# Patient Record
Sex: MALE | Race: WHITE | NOT HISPANIC OR LATINO | Employment: OTHER | URBAN - METROPOLITAN AREA
[De-identification: names, ages, dates, MRNs, and addresses within clinical notes are randomized per-mention and may not be internally consistent; named-entity substitution may affect disease eponyms.]

---

## 2017-08-12 ENCOUNTER — ANESTHESIA EVENT (OUTPATIENT)
Dept: SURGERY | Facility: CLINIC | Age: 27
End: 2017-08-12
Payer: MEDICAID

## 2017-08-13 ENCOUNTER — NURSE TRIAGE (OUTPATIENT)
Dept: NURSING | Facility: CLINIC | Age: 27
End: 2017-08-13

## 2017-08-13 NOTE — TELEPHONE ENCOUNTER
Mother calling to report that Ge is scheduled for a teeth cleaning under anesthesia tomorrow morning. But this morning had a several hour episode of very mild nasal  Congestion. Has since resolved. Denies fever, cough, breathing problems or other concerns. Advised as long as continues to do well and be symptom free  Should be ok for procedure tomorrow. If symptoms redevelop contact surgery clinic in AM.  Jael Whitfield RN   [facility]    Additional Information    Negative: Runny nose is caused by pollen or other allergies    Negative: Cough is main symptom    Negative: Severe sore throat    Negative: Fever > 104 F (40 C)    Negative: [1] Difficulty breathing AND [2] not severe AND [3] not from stuffy nose (e.g., not relieved by cleaning out the nose)    Negative: Patient sounds very sick or weak to the triager    Negative: [1] Fever > 101 F (38.3 C) AND [2] age > 60    Negative: [1] Fever > 101 F (38.3 C) AND [2] bedridden (e.g., nursing home patient, CVA, chronic illness, recovering from surgery)    Negative: [1] Fever > 100.5 F (38.1 C) AND [2] diabetes mellitus or weak immune system (e.g., HIV positive, cancer chemo, splenectomy, organ transplant, chronic steroids)    Negative: Fever present > 3 days (72 hours)    Negative: [1] Fever returns after gone for over 24 hours AND [2] symptoms worse or not improved    Negative: [1] Sinus pain (not just congestion) AND [2] fever    Negative: Earache    Negative: [1] SEVERE sore throat AND [2] present > 24 hours    Negative: [1] Sinus congestion (pressure, fullness) AND [2] present > 10 days    Negative: [1] Nasal discharge AND [2] present > 10 days    Negative: [1] Using nasal washes and pain medicine > 24 hours AND [2] sinus pain (lower forehead, cheekbone, or eye) persists    Negative: Sores with yellow scabs around the nasal opening    Negative: Cold with no complications (all triage questions negative)    Negative: Care advice for mild cough,  questions about    Negative: Care advice for fever, questions about    Negative: Zinc, vitamin C, and echinacea to treat the common cold, questions about    Negative: Neti Pot, questions about    Negative: [1] Caller is not with the adult (patient) AND [2] reporting urgent symptoms    Negative: Lab result questions    Negative: Medication questions    Negative: Caller cannot be reached by phone    Negative: Caller has already spoken to PCP or another triager    Negative: RN needs further essential information from caller in order to complete triage    Negative: Requesting regular office appointment    Negative: [1] Caller requesting NON-URGENT health information AND [2] PCP's office is the best resource    Health Information question, no triage required and triager able to answer question    Protocols used: COMMON COLD-ADULT-AH, INFORMATION ONLY CALL-ADULT-AH

## 2017-08-14 ENCOUNTER — ANESTHESIA (OUTPATIENT)
Dept: SURGERY | Facility: CLINIC | Age: 27
End: 2017-08-14
Payer: MEDICAID

## 2017-08-14 ENCOUNTER — SURGERY (OUTPATIENT)
Age: 27
End: 2017-08-14

## 2017-08-14 ENCOUNTER — HOSPITAL ENCOUNTER (OUTPATIENT)
Facility: CLINIC | Age: 27
Discharge: HOME | End: 2017-08-14
Attending: DENTIST | Admitting: DENTIST
Payer: MEDICAID

## 2017-08-14 VITALS
DIASTOLIC BLOOD PRESSURE: 88 MMHG | SYSTOLIC BLOOD PRESSURE: 124 MMHG | WEIGHT: 253.09 LBS | OXYGEN SATURATION: 92 % | HEIGHT: 73 IN | TEMPERATURE: 98.3 F | BODY MASS INDEX: 33.54 KG/M2 | HEART RATE: 108 BPM | RESPIRATION RATE: 15 BRPM

## 2017-08-14 PROCEDURE — 37000008 ZZH ANESTHESIA TECHNICAL FEE, 1ST 30 MIN: Performed by: DENTIST

## 2017-08-14 PROCEDURE — 25000128 H RX IP 250 OP 636: Performed by: DENTIST

## 2017-08-14 PROCEDURE — 36000051 ZZH SURGERY LEVEL 2 1ST 30 MIN - UMMC: Performed by: DENTIST

## 2017-08-14 PROCEDURE — 71000027 ZZH RECOVERY PHASE 2 EACH 15 MINS: Performed by: DENTIST

## 2017-08-14 PROCEDURE — 25000132 ZZH RX MED GY IP 250 OP 250 PS 637: Performed by: ANESTHESIOLOGY

## 2017-08-14 PROCEDURE — 36000053 ZZH SURGERY LEVEL 2 EA 15 ADDTL MIN - UMMC: Performed by: DENTIST

## 2017-08-14 PROCEDURE — 25000128 H RX IP 250 OP 636: Performed by: NURSE ANESTHETIST, CERTIFIED REGISTERED

## 2017-08-14 PROCEDURE — 27210794 ZZH OR GENERAL SUPPLY STERILE: Performed by: DENTIST

## 2017-08-14 PROCEDURE — 25000125 ZZHC RX 250: Performed by: NURSE ANESTHETIST, CERTIFIED REGISTERED

## 2017-08-14 PROCEDURE — 37000009 ZZH ANESTHESIA TECHNICAL FEE, EACH ADDTL 15 MIN: Performed by: DENTIST

## 2017-08-14 PROCEDURE — 40000170 ZZH STATISTIC PRE-PROCEDURE ASSESSMENT II: Performed by: DENTIST

## 2017-08-14 PROCEDURE — 71000014 ZZH RECOVERY PHASE 1 LEVEL 2 FIRST HR: Performed by: DENTIST

## 2017-08-14 PROCEDURE — 25000566 ZZH SEVOFLURANE, EA 15 MIN: Performed by: DENTIST

## 2017-08-14 PROCEDURE — 25000128 H RX IP 250 OP 636: Performed by: ANESTHESIOLOGY

## 2017-08-14 RX ORDER — KETAMINE HYDROCHLORIDE 10 MG/ML
INJECTION, SOLUTION INTRAMUSCULAR; INTRAVENOUS PRN
Status: DISCONTINUED | OUTPATIENT
Start: 2017-08-14 | End: 2017-08-14

## 2017-08-14 RX ORDER — SODIUM CHLORIDE, SODIUM LACTATE, POTASSIUM CHLORIDE, CALCIUM CHLORIDE 600; 310; 30; 20 MG/100ML; MG/100ML; MG/100ML; MG/100ML
INJECTION, SOLUTION INTRAVENOUS CONTINUOUS
Status: DISCONTINUED | OUTPATIENT
Start: 2017-08-14 | End: 2017-08-14 | Stop reason: HOSPADM

## 2017-08-14 RX ORDER — FENTANYL CITRATE 50 UG/ML
INJECTION, SOLUTION INTRAMUSCULAR; INTRAVENOUS PRN
Status: DISCONTINUED | OUTPATIENT
Start: 2017-08-14 | End: 2017-08-14

## 2017-08-14 RX ORDER — LIDOCAINE HYDROCHLORIDE 20 MG/ML
INJECTION, SOLUTION INFILTRATION; PERINEURAL PRN
Status: DISCONTINUED | OUTPATIENT
Start: 2017-08-14 | End: 2017-08-14

## 2017-08-14 RX ORDER — ONDANSETRON 2 MG/ML
INJECTION INTRAMUSCULAR; INTRAVENOUS PRN
Status: DISCONTINUED | OUTPATIENT
Start: 2017-08-14 | End: 2017-08-14

## 2017-08-14 RX ORDER — PROPOFOL 10 MG/ML
INJECTION, EMULSION INTRAVENOUS PRN
Status: DISCONTINUED | OUTPATIENT
Start: 2017-08-14 | End: 2017-08-14

## 2017-08-14 RX ORDER — CEFAZOLIN SODIUM 2 G/100ML
2 INJECTION, SOLUTION INTRAVENOUS
Status: COMPLETED | OUTPATIENT
Start: 2017-08-14 | End: 2017-08-14

## 2017-08-14 RX ORDER — OXYMETAZOLINE HYDROCHLORIDE 0.05 G/100ML
SPRAY NASAL PRN
Status: DISCONTINUED | OUTPATIENT
Start: 2017-08-14 | End: 2017-08-14

## 2017-08-14 RX ORDER — MEPERIDINE HYDROCHLORIDE 25 MG/ML
12.5 INJECTION INTRAMUSCULAR; INTRAVENOUS; SUBCUTANEOUS
Status: DISCONTINUED | OUTPATIENT
Start: 2017-08-14 | End: 2017-08-14 | Stop reason: HOSPADM

## 2017-08-14 RX ORDER — LIDOCAINE 40 MG/G
CREAM TOPICAL
Status: DISCONTINUED | OUTPATIENT
Start: 2017-08-14 | End: 2017-08-14 | Stop reason: HOSPADM

## 2017-08-14 RX ORDER — ONDANSETRON 4 MG/1
4 TABLET, ORALLY DISINTEGRATING ORAL EVERY 30 MIN PRN
Status: DISCONTINUED | OUTPATIENT
Start: 2017-08-14 | End: 2017-08-14 | Stop reason: HOSPADM

## 2017-08-14 RX ORDER — HYDROMORPHONE HYDROCHLORIDE 1 MG/ML
.3-.5 INJECTION, SOLUTION INTRAMUSCULAR; INTRAVENOUS; SUBCUTANEOUS EVERY 10 MIN PRN
Status: DISCONTINUED | OUTPATIENT
Start: 2017-08-14 | End: 2017-08-14 | Stop reason: HOSPADM

## 2017-08-14 RX ORDER — NEOSTIGMINE METHYLSULFATE 1 MG/ML
VIAL (ML) INJECTION PRN
Status: DISCONTINUED | OUTPATIENT
Start: 2017-08-14 | End: 2017-08-14

## 2017-08-14 RX ORDER — MIDAZOLAM HYDROCHLORIDE 2 MG/ML
20 SYRUP ORAL ONCE
Status: COMPLETED | OUTPATIENT
Start: 2017-08-14 | End: 2017-08-14

## 2017-08-14 RX ORDER — FENTANYL CITRATE 50 UG/ML
25-50 INJECTION, SOLUTION INTRAMUSCULAR; INTRAVENOUS EVERY 5 MIN PRN
Status: DISCONTINUED | OUTPATIENT
Start: 2017-08-14 | End: 2017-08-14 | Stop reason: HOSPADM

## 2017-08-14 RX ORDER — NALOXONE HYDROCHLORIDE 0.4 MG/ML
.1-.4 INJECTION, SOLUTION INTRAMUSCULAR; INTRAVENOUS; SUBCUTANEOUS
Status: DISCONTINUED | OUTPATIENT
Start: 2017-08-14 | End: 2017-08-14 | Stop reason: HOSPADM

## 2017-08-14 RX ORDER — GLYCOPYRROLATE 0.2 MG/ML
INJECTION, SOLUTION INTRAMUSCULAR; INTRAVENOUS PRN
Status: DISCONTINUED | OUTPATIENT
Start: 2017-08-14 | End: 2017-08-14

## 2017-08-14 RX ORDER — DEXAMETHASONE SODIUM PHOSPHATE 4 MG/ML
INJECTION, SOLUTION INTRA-ARTICULAR; INTRALESIONAL; INTRAMUSCULAR; INTRAVENOUS; SOFT TISSUE PRN
Status: DISCONTINUED | OUTPATIENT
Start: 2017-08-14 | End: 2017-08-14

## 2017-08-14 RX ORDER — ONDANSETRON 2 MG/ML
4 INJECTION INTRAMUSCULAR; INTRAVENOUS EVERY 30 MIN PRN
Status: DISCONTINUED | OUTPATIENT
Start: 2017-08-14 | End: 2017-08-14 | Stop reason: HOSPADM

## 2017-08-14 RX ADMIN — Medication 50 MG: at 08:01

## 2017-08-14 RX ADMIN — ONDANSETRON 4 MG: 2 INJECTION INTRAMUSCULAR; INTRAVENOUS at 10:20

## 2017-08-14 RX ADMIN — CEFAZOLIN SODIUM 2 G: 2 INJECTION, SOLUTION INTRAVENOUS at 08:17

## 2017-08-14 RX ADMIN — OXYMETAZOLINE HYDROCHLORIDE 2 SPRAY: 5 SPRAY NASAL at 08:01

## 2017-08-14 RX ADMIN — FENTANYL CITRATE 25 MCG: 50 INJECTION, SOLUTION INTRAMUSCULAR; INTRAVENOUS at 09:46

## 2017-08-14 RX ADMIN — Medication 1 MG: at 10:20

## 2017-08-14 RX ADMIN — DEXAMETHASONE SODIUM PHOSPHATE 8 MG: 4 INJECTION, SOLUTION INTRAMUSCULAR; INTRAVENOUS at 08:45

## 2017-08-14 RX ADMIN — DEXMEDETOMIDINE HYDROCHLORIDE 4 MCG: 100 INJECTION, SOLUTION INTRAVENOUS at 07:51

## 2017-08-14 RX ADMIN — LIDOCAINE HYDROCHLORIDE 100 MG: 20 INJECTION, SOLUTION INFILTRATION; PERINEURAL at 07:59

## 2017-08-14 RX ADMIN — NEOSTIGMINE METHYLSULFATE 5 MG: 1 INJECTION INTRAMUSCULAR; INTRAVENOUS; SUBCUTANEOUS at 10:20

## 2017-08-14 RX ADMIN — MIDAZOLAM HYDROCHLORIDE 2 MG: 1 INJECTION, SOLUTION INTRAMUSCULAR; INTRAVENOUS at 07:51

## 2017-08-14 RX ADMIN — KETAMINE HCL-NACL SOLN PREF SY 50 MG/5ML-0.9% (10MG/ML) 50 MG: 10 SOLUTION PREFILLED SYRINGE at 07:59

## 2017-08-14 RX ADMIN — FENTANYL CITRATE 75 MCG: 50 INJECTION, SOLUTION INTRAMUSCULAR; INTRAVENOUS at 07:59

## 2017-08-14 RX ADMIN — MIDAZOLAM HYDROCHLORIDE 20 MG: 2 SYRUP ORAL at 07:12

## 2017-08-14 RX ADMIN — PROPOFOL 150 MG: 10 INJECTION, EMULSION INTRAVENOUS at 08:01

## 2017-08-14 RX ADMIN — SODIUM CHLORIDE, POTASSIUM CHLORIDE, SODIUM LACTATE AND CALCIUM CHLORIDE: 600; 310; 30; 20 INJECTION, SOLUTION INTRAVENOUS at 07:51

## 2017-08-14 RX ADMIN — DEXMEDETOMIDINE HYDROCHLORIDE 4 MCG: 100 INJECTION, SOLUTION INTRAVENOUS at 07:53

## 2017-08-14 NOTE — ANESTHESIA PREPROCEDURE EVALUATION
Anesthesia Evaluation     . Pt has had prior anesthetic. Type: General    History of anesthetic complications   - PONV        ROS/MED HX    ENT/Pulmonary:  - neg pulmonary ROS     Neurologic:     (+)Developmental delay (fetal alcohol syndrome)      Cardiovascular:  - neg cardiovascular ROS       METS/Exercise Tolerance:  3 - Able to walk 1-2 blocks without stopping   Hematologic:         Musculoskeletal:         GI/Hepatic:  - neg GI/hepatic ROS       Renal/Genitourinary:  - ROS Renal section negative       Endo:  - neg endo ROS       Psychiatric:         Infectious Disease:         Malignancy:         Other:                     Physical Exam      Airway   Comment: Unable to cooperate     Dental     Cardiovascular   Rhythm and rate: regular and normal      Pulmonary    breath sounds clear to auscultation                    Anesthesia Plan      History & Physical Review  History and physical reviewed and following examination; no interval change.    ASA Status:  2 .    NPO Status:  > 8 hours    Plan for General and ETT with Intravenous induction. Maintenance will be Inhalation.    PONV prophylaxis:  Ondansetron (or other 5HT-3), Dexamethasone or Solumedrol and Droperidol or Haldol       Postoperative Care  Postoperative pain management:  IV analgesics.      Consents  Anesthetic plan, risks, benefits and alternatives discussed with:  Parent (Mother and/or Father)..                          .

## 2017-08-14 NOTE — IP AVS SNAPSHOT
[place] [id number] [facility] [address]    Phone:  [phone]                                       After Visit Summary   8/14/2017    eG Gregory    MRN: [id number]           After Visit Summary Signature Page     I have received my discharge instructions, and my questions have been answered. I have discussed any challenges I see with this plan with the nurse or doctor.    ..........................................................................................................................................  Patient/Patient Representative Signature      ..........................................................................................................................................  Patient Representative Print Name and Relationship to Patient    ..................................................               ................................................  Date                                            Time    ..........................................................................................................................................  Reviewed by Signature/Title    ...................................................              ..............................................  Date                                                            Time

## 2017-08-14 NOTE — BRIEF OP NOTE
[facility], [place]    Brief Operative Note    Pre-operative diagnosis: Dental Caries, Periodontal Disease   Post-operative diagnosis Dental caries chronic gingivitis  Procedure: Procedure(s):  Dental Exam, Restorations x4, Radiographs, Periodontal Therapy, fluride treatment - Wound Class: II-Clean Contaminated  Surgeon: Surgeon(s) and Role:     * Wilfred Campos DDS - Primary     * Fabian Abad MD - Resident - Assisting  Anesthesia: General   Estimated blood loss: 2 cc  Drains: None  Specimens: * No specimens in log *  Findings:   None.  Complications: None.  Implants: None.

## 2017-08-14 NOTE — IP AVS SNAPSHOT
MRN:[id number]                      After Visit Summary   8/14/2017    Ge Gregory    MRN: [id number]           Thank you!     Thank you for choosing [facility] for your care. Our goal is always to provide you with excellent care. Hearing back from our patients is one way we can continue to improve our services. Please take a few minutes to complete the written survey that you may receive in the mail after you visit with us. Thank you!        Patient Information     Date Of Birth          1990        About your hospital stay     You were admitted on:  August 14, 2017 You last received care in the:  [facility] PACU    You were discharged on:  August 14, 2017       Who to Call     For medical emergencies, please call 911.  For non-urgent questions about your medical care, please call your primary care provider or clinic, [phone]  For questions related to your surgery, please call your surgery clinic        Attending Provider     Provider Wilfred Nova DDS Dentist       Primary Care Provider Office Phone # Fax #    Missy Thompson [phone]      After Care Instructions     Discharge Instructions       Return to [facility] dental clinic in 6 months for recall and follow up.  Call the dental clinic to schedule the appointment.  Clinic phone: [phone]  Emergency post op care (after business hours and weekends) call: [phone], and ask for the dental resident on call.    Procedures Performed Today (August 14, 2017)  Dental exam, dental x-rays, cleaning, dental fillings, fluoride varnish.     Post-operative oral surgery instructions  Care of the mouth following a surgical procedure is essential in the healing process.  There is a certain amount of swelling, discoloration, discomfort and bleeding which can be expected.     Swelling:  Some degree of swelling is normal and can be minimized with the use of ice or cold packs applied to the area for 15-20 minutes and then  removed for 15-20 minutes.  This should only be done for the first 24 hours, after 24 hours use a warm moist compress over the area for 15-20 minutes and then remove for 15-20 minutes.  Sit upright and keep your head elevated when sleeping.  Maximum swelling will occur about the second or third post-operative day and then slowly recede. Once present, it can remain swollen for up to 7 days and discomfort may persist for 10 days.    Discomfort:   A variable amount of pain follows extraction and oral surgery procedures. Tylenol, ibuprofen, or any over the counter pain medication can be used. In some cases, prescription pain medication will be given which  should be taken exactly as directed, and taken before the local anesthetic wears off. If pain increases for more than 3 days call the clinic.   Do not take pain medication on an empty stomach as it may cause nausea.     Bleeding:  Some bleeding and oozing is to be expected for several hours.  Avoid spitting, rinsing, swishing, and use of a straw for the next 24-48 hours, as they may provoke oozing.  If bleeding is visible then place a moistened gauze pack over the area and keep firm pressure on the gauze pack for 30 minutes and then discard.     Discoloration:   Facial discoloration (black and blue bruising) often follows oral surgery procedures. Discoloration is normal and is no cause for alarm. It may persist for as long as several weeks.    Jaw Stiffness:   For several days following most oral surgical procedures, the jaw may become somewhat stiff. Should jaw stiffness worsen after 2  weeks, call the clinic.    Numbness:    Local anesthetics may be effective for approximately 24-48 hours. If you are numb beyond this time frame, please call the clinic.    Nausea:    Nausea is common after surgery, and it is sometimes caused by pain medicines. Nausea may be reduced by preceding each pill with a small amount of soft food, then taking the pill with a large volume of  water. Continue consuming clear fluids and minimize the pain medication. Call if you don't feel better or if vomiting is a problem. Soft drinks that have less carbonation may help with nausea.    Care of the mouth:  A day following surgery rinse with warm salt water after each meal or 3-4 times a day (One half teaspoonful of table salt in a full glass of warm water). Resume normal oral hygiene (brushing and flossing) within 24 hours after procedure.  Clean your teeth within the bounds of comfort.   Avoid use of alcohol, smoking, or carbonated drinks for 24-48 hours after surgery.  This may slow the healing process.      Diet:  A soft or liquid diet is recommended for the first few days following surgery, advance as tolerated. Until local anesthesia (numbness) wears off, be careful chewing to prevent biting the numb area. Eat soft and liquid foods such as yogurt, cottage cheese, soup etc. Try not to skip a meal, and keep up normal diet.    Rest:  Rest as much as possible for the next 24 hours, avoiding any excessive amount of physical activity.     Fluoride Varnish:  A 5% sodium fluoride varnish was placed over the teeth for the prevention of dental decay.  The varnish hardens on contact with saliva so the teeth may appear spotty or as if there is a thin film coating the teeth, this is normal.  The varnish should remain on your teeth for at least 4-6 hours for the maximum effect.  It is recommended that you only eat soft foods and drink cold liquids during this time, do not eat or drink anything hot and do not brush your teeth the day of your surgery.  The varnish with naturally wear away, and can be brushed off the next day.     Note:  1. Return to work or school in 24-48 hours                  Further instructions from your care team       Same-Day Surgery   Adult Discharge Orders & Instructions     For 24 hours after surgery:  1. Get plenty of rest.  A responsible adult must stay with you for at least 24 hours  after you leave the hospital.   2. Pain medication can slow your reflexes. Do not drive or use heavy equipment.  If you have weakness or tingling, don't drive or use heavy equipment until this feeling goes away.  3. Mixing alcohol and pain medication can cause dizziness and slow your breathing. It can even be fatal. Do not drink alcohol while taking pain medication.  4. Avoid strenuous or risky activities.  Ask for help when climbing stairs.   5. You may feel lightheaded.  If so, sit for a few minutes before standing.  Have someone help you get up.   6. If you have nausea (feel sick to your stomach), drink only clear liquids such as apple juice, ginger ale, broth or 7-Up.  Rest may also help.  Be sure to drink enough fluids.  Move to a regular diet as you feel able. Take pain medications with a small amount of solid food, such as toast or crackers, to avoid nausea.   7. A slight fever is normal. Call the doctor if your fever is over 100 F (37.7 C) (taken under the tongue) or lasts longer than 24 hours.  8. You may have a dry mouth, muscle aches, trouble sleeping or a sore throat.  These symptoms should go away after 24 hours.  9. Do not make important or legal decisions.   Pain Management:      1. Take pain medication (if prescribed) for pain as directed by your physician.        2. WARNING: If the pain medication you have been prescribed contains Tylenol  (acetaminophen), DO NOT take additional doses of Tylenol (acetaminophen).     Call your doctor for any of the followin.  Signs of infection (fever, growing tenderness at the surgery site, severe pain, a large amount of drainage or bleeding, foul-smelling drainage, redness, swelling).    2.  It has been over 8 to 10 hours since surgery and you are still not able to urinate (pee).    3.  Headache for over 24 hours.    4.  Numbness, tingling or weakness the day after surgery (if you had spinal anesthesia).  To contact a doctor, call  "_____________________________________ or:      [phone] and ask for the Resident On Call for:          __________________________________________ (answered 24 hours a day)      Emergency Department:  [facility]: [phone]  [facility]: [phone]               Rev. 10/2014       Pending Results     No orders found from 8/12/2017 to 8/15/2017.            Admission Information     Date & Time Provider Department Dept. Phone    8/14/2017 Wilfred Campos DDS [facility] PACU [phone]      Your Vitals Were     Blood Pressure Pulse Temperature Respirations Height Weight    133/84 108 98.1  F (36.7  C) (Axillary) 11 1.854 m (6' 1\") 114.8 kg (253 lb 1.4 oz)    Pulse Oximetry BMI (Body Mass Index)                94% 33.39 kg/m2          MyChart Information     [facility] gives you secure access to your electronic health record. If you see a primary care provider, you can also send messages to your care team and make appointments. If you have questions, please call your primary care clinic.  If you do not have a primary care provider, please call [phone] and they will assist you.        Care EveryWhere ID     This is your Care EveryWhere ID. This could be used by other organizations to access your [facility] medical records  [id number]        Equal Access to Services     [facility] : Hadnahun patel Sogutierrez, waaxda luqadaha, qaybta kaaljuli whitehead . So [facility] [phone].    ATENCIÓN: Si habla español, tiene a boston disposición servicios gratuitos de asistencia lingüística. Llame al [phone].    We comply with applicable federal civil rights laws and [place] laws. We do not discriminate on the basis of race, color, national origin, age, disability sex, sexual orientation or gender identity.               Review of your medicines      CONTINUE these medicines which have NOT CHANGED        Dose / Directions    ibuprofen " 600 MG tablet   Commonly known as:  ADVIL/MOTRIN   Used for:  Post-op pain        Dose:  600 mg   Take 1 tablet (600 mg) by mouth every 6 hours as needed for moderate pain   Quantity:  30 tablet   Refills:  1                Protect others around you: Learn how to safely use, store and throw away your medicines at www.disposemymeds.org.             Medication List: This is a list of all your medications and when to take them. Check marks below indicate your daily home schedule. Keep this list as a reference.      Medications           Morning Afternoon Evening Bedtime As Needed    ibuprofen 600 MG tablet   Commonly known as:  ADVIL/MOTRIN   Take 1 tablet (600 mg) by mouth every 6 hours as needed for moderate pain

## 2017-08-14 NOTE — DISCHARGE INSTRUCTIONS
Same-Day Surgery   Adult Discharge Orders & Instructions     For 24 hours after surgery:  1. Get plenty of rest.  A responsible adult must stay with you for at least 24 hours after you leave the hospital.   2. Pain medication can slow your reflexes. Do not drive or use heavy equipment.  If you have weakness or tingling, don't drive or use heavy equipment until this feeling goes away.  3. Mixing alcohol and pain medication can cause dizziness and slow your breathing. It can even be fatal. Do not drink alcohol while taking pain medication.  4. Avoid strenuous or risky activities.  Ask for help when climbing stairs.   5. You may feel lightheaded.  If so, sit for a few minutes before standing.  Have someone help you get up.   6. If you have nausea (feel sick to your stomach), drink only clear liquids such as apple juice, ginger ale, broth or 7-Up.  Rest may also help.  Be sure to drink enough fluids.  Move to a regular diet as you feel able. Take pain medications with a small amount of solid food, such as toast or crackers, to avoid nausea.   7. A slight fever is normal. Call the doctor if your fever is over 100 F (37.7 C) (taken under the tongue) or lasts longer than 24 hours.  8. You may have a dry mouth, muscle aches, trouble sleeping or a sore throat.  These symptoms should go away after 24 hours.  9. Do not make important or legal decisions.   Pain Management:      1. Take pain medication (if prescribed) for pain as directed by your physician.        2. WARNING: If the pain medication you have been prescribed contains Tylenol  (acetaminophen), DO NOT take additional doses of Tylenol (acetaminophen).     Call your doctor for any of the followin.  Signs of infection (fever, growing tenderness at the surgery site, severe pain, a large amount of drainage or bleeding, foul-smelling drainage, redness, swelling).    2.  It has been over 8 to 10 hours since surgery and you are still not able to urinate (pee).    3.   Headache for over 24 hours.    4.  Numbness, tingling or weakness the day after surgery (if you had spinal anesthesia).  To contact a doctor, call _____________________________________ or:      [phone] and ask for the Resident On Call for:          __________________________________________ (answered 24 hours a day)      Emergency Department:  [facility]: [phone]  [facility]: [phone]               Rev. 10/2014

## 2017-08-14 NOTE — ANESTHESIA POSTPROCEDURE EVALUATION
Patient: Ge Gregory    Procedure(s):  Dental Exam, Restorations x4, Radiographs, Periodontal Therapy, fluride treatment - Wound Class: II-Clean Contaminated    Diagnosis:Dental Caries, Periodontal Disease   Diagnosis Additional Information: No value filed.    Anesthesia Type:  General, ETT    Note:  Anesthesia Post Evaluation    Patient location during evaluation: PACU  Post op Mental Status: interacting appropriate for patient.  Level of consciousness: awake and alert  Pain management: adequate  Airway patency: patent  Cardiovascular status: acceptable  Respiratory status: acceptable  Hydration status: acceptable  PONV: none             Last vitals:  Vitals:    08/14/17 1100 08/14/17 1115 08/14/17 1145   BP: 133/84 128/75 124/88   Pulse:      Resp: 11 20 15   Temp:  36.8  C (98.3  F)    SpO2: 94% 94% 92%         Electronically Signed By: Miranda Rowell MD  August 14, 2017  12:18 PM

## 2017-08-14 NOTE — ANESTHESIA CARE TRANSFER NOTE
Patient: Ge Gregory    Procedure(s):  Dental Exam, Restorations x4, Radiographs, Periodontal Therapy, fluride treatment - Wound Class: II-Clean Contaminated    Diagnosis: Dental Caries, Periodontal Disease   Diagnosis Additional Information: No value filed.    Anesthesia Type:   General, ETT     Note:  Airway :Face Mask  Patient transferred to:PACU  Comments: Good spont resp, VSS, report to RN.      Vitals: (Last set prior to Anesthesia Care Transfer)    CRNA VITALS  8/14/2017 1015 - 8/14/2017 1046      8/14/2017             Resp Rate (set): 10                Electronically Signed By: ASHLEY Sheridan CRNA  August 14, 2017  10:46 AM

## 2017-08-15 NOTE — OP NOTE
DATE OF PROCEDURE:  08/14/2017      It was deemed necessary for this patient to be seen in the hospital operating room because of developmental delay and autism and inability to be treated in a traditional dental clinic setting.      PROCEDURES:  Under general anesthesia the following operations were performed in the mouth:   1.  Bilateral dental examination.   2.  Dental radiographs.   3.  Prophylaxis.   4.  Dental restorations x4.   5.  Fluoride varnish application.        ATTENDING PHYSICIAN:  Wilfred Campos DDS      :  Fabian Abad DDS      ANESTHESIOLOGIST:  Charlette Rowell MD.       SCRUB NURSE:  Cesilia Thibodeaux.       CIRCULATING NURSE:  Ondina Russell.      CRNA:  Emmanuel Judge.      PREOPERATIVE DIAGNOSIS:  Suspected periodontal disease and dental caries.      POSTOPERATIVE DIAGNOSES:   1.  Chronic generalized gingivitis.   2.  Dental caries.   3.  Fractured restorations.       ANESTHESIA:  General.      DESCRIPTION OF PROCEDURE:  Ge Gregory was brought into the operating room and draped in the usual customary [facility] [place] fashion.  Following the timeout procedures, general anesthesia was administered through the patient's right naris.  Examination and bilateral dental exam was performed and a series of 4 bite wing radiographs were obtained and interpreted.  A moist throat pack was placed at 8:58 a.m.  Observations:  Clinical examination revealed decayed teeth #19- buccal and #30- Buccal, generalized moderate to heavy plaque and light calculus, generalized bleeding upon probing, periodontal pockets ranging from 2-4 mm.  Radiographic examination revealed normal bone trabeculation, generalized horizontal bone loss, dental caries on teeth #12 and 20.  Local Anesthesia:  None was used.      The following procedures were performed:  Periotherapy was performed on all teeth using ultrasonic debridement.  Supragingival scaling was performed with rubber cup  polishing and flossing.  Dental restorations, #12 mesial occlusal amalgam, #19 buccal Fuji 2 glass ionomer resin, #20 distal occlusal glass ionomer resin and #30 buccal Z250 composite.  Fluoride varnish was applied to all teeth.  The throat pack was removed with suction at 10:16 a.m.  The oropharynx was inspected and found to be clear.  Estimated blood loss was 2 mL.        The attending doctor, Dr. Barnard, was present for the entire procedure.        General anesthesia finished, the patient was extubated in the operating room and taken to the postanesthesia care unit in good condition.         MARILYNN BARNARD DDS       As dictated by NORM KHOURY MD            D: 2017 18:22   T: 08/15/2017 07:52   MT: alva      Name:     ROBERTO COSTA   MRN:      [id number]        Account:        [id number]   :      1990           Procedure Date: 2017      Document: [id number]

## 2018-03-23 ENCOUNTER — OFFICE VISIT (OUTPATIENT)
Dept: OPTOMETRY | Facility: CLINIC | Age: 28
End: 2018-03-23
Payer: MEDICAID

## 2018-03-23 DIAGNOSIS — H52.223 REGULAR ASTIGMATISM OF BOTH EYES: ICD-10-CM

## 2018-03-23 DIAGNOSIS — H50.112 EXOTROPIA OF LEFT EYE: Primary | ICD-10-CM

## 2018-03-23 DIAGNOSIS — Z98.890 HISTORY OF PTOSIS REPAIR: ICD-10-CM

## 2018-03-23 PROCEDURE — 92015 DETERMINE REFRACTIVE STATE: CPT | Performed by: OPTOMETRIST

## 2018-03-23 PROCEDURE — 92004 COMPRE OPH EXAM NEW PT 1/>: CPT | Performed by: OPTOMETRIST

## 2018-03-23 ASSESSMENT — REFRACTION_MANIFEST
OD_SPHERE: PLANO
OS_AXIS: 180
OS_SPHERE: -0.25
OD_AXIS: 180
OD_CYLINDER: +0.50
OS_CYLINDER: +0.50

## 2018-03-23 ASSESSMENT — TONOMETRY
IOP_METHOD: BOTH EYES NORMAL BY PALPATION
OD_IOP_MMHG: SOFT
IOP_UNABLETOASSESS: 1
OS_IOP_MMHG: SOFT

## 2018-03-23 ASSESSMENT — SLIT LAMP EXAM - LIDS
COMMENTS: NORMAL
COMMENTS: NORMAL

## 2018-03-23 ASSESSMENT — CUP TO DISC RATIO
OD_RATIO: 0.1
OS_RATIO: 0.1

## 2018-03-23 ASSESSMENT — EXTERNAL EXAM - RIGHT EYE: OD_EXAM: NORMAL

## 2018-03-23 ASSESSMENT — VISUAL ACUITY
OD_SC: 20/30
OS_SC: 20/30
METHOD_MR_RETINOSCOPY: 1
OS_SC: 20/40
METHOD: ALLEN PICTURES
OD_SC: 20/40
OD_SC+: -1

## 2018-03-23 ASSESSMENT — EXTERNAL EXAM - LEFT EYE: OS_EXAM: NORMAL

## 2018-03-23 NOTE — MR AVS SNAPSHOT
After Visit Summary   3/23/2018    Ge Gregory    MRN: [id number]           Patient Information     Date Of Birth          1990        Visit Information        Provider Department      3/23/2018 8:40 AM Matt Caldwell, OD [facility]        Today's Diagnoses     Exotropia of left eye    -  1    History of ptosis repair        Regular astigmatism of both eyes          Care Instructions    Monitor exotropia.    No glasses recommended.    Return in 1 year for a complete eye exam or sooner if needed.    Matt Caldwell OD            Follow-ups after your visit        Follow-up notes from your care team     Return in about 1 year (around 3/23/2019) for Annual Visit.      Who to contact     If you have questions or need follow up information about today's clinic visit or your schedule please contact [facility] directly at [phone].  Normal or non-critical lab and imaging results will be communicated to you by [email or web address], letter or phone within 4 business days after the clinic has received the results. If you do not hear from us within 7 days, please contact the clinic through [email or web address] or phone. If you have a critical or abnormal lab result, we will notify you by phone as soon as possible.  Submit refill requests through [employer] or call your pharmacy and they will forward the refill request to us. Please allow 3 business days for your refill to be completed.          Additional Information About Your Visit        MyChart Information     [employer] gives you secure access to your electronic health record. If you see a primary care provider, you can also send messages to your care team and make appointments. If you have questions, please call your primary care clinic.  If you do not have a primary care provider, please call [phone] and they will assist you.      [employer] is an electronic gateway that provides easy, online access to your medical records. With [employer], you  can request a clinic appointment, read your test results, renew a prescription or communicate with your care team.     To access your existing account, please contact your [facility] or call [phone] for assistance.        Care EveryWhere ID     This is your Care EveryWhere ID. This could be used by other organizations to access your [place] medical records  [id number]         Blood Pressure from Last 3 Encounters:   08/14/17 124/88   08/01/16 125/90   10/23/13 129/82    Weight from Last 3 Encounters:   08/14/17 114.8 kg (253 lb 1.4 oz)   08/01/16 108.8 kg (239 lb 13.8 oz)   10/23/13 106.3 kg (234 lb 5.6 oz)              We Performed the Following     EYE EXAM (SIMPLE-NONBILLABLE)     REFRACTION        Primary Care Provider Office Phone # Fax #    Missy Thompson [phone]       [facility] [address]  [facility] [address]        Equal Access to Services     KARLENE BERNARD : Hadii aad ku hadasho Soomaali, waaxda luqadaha, qaybta kaalmada adeegyada, waxay idiin hayaan alemeg noearamónica espinosa . So [facility] [phone].    ATENCIÓN: Si habla español, tiene a boston disposición servicios gratuitos de asistencia lingüística. [facility] [phone].    We comply with applicable federal civil rights laws and [place] laws. We do not discriminate on the basis of race, color, national origin, age, disability, sex, sexual orientation, or gender identity.            Thank you!     Thank you for choosing [facility]  for your care. Our goal is always to provide you with excellent care. Hearing back from our patients is one way we can continue to improve our services. Please take a few minutes to complete the written survey that you may receive in the mail after your visit with us. Thank you!             Your Updated Medication List - Protect others around you: Learn how to safely use, store and throw away your medicines at www.disposemymeds.org.          This  list is accurate as of 3/23/18  9:58 AM.  Always use your most recent med list.                   Brand Name Dispense Instructions for use Diagnosis    ibuprofen 600 MG tablet    ADVIL/MOTRIN    30 tablet    Take 1 tablet (600 mg) by mouth every 6 hours as needed for moderate pain    Post-op pain

## 2018-03-23 NOTE — PROGRESS NOTES
Chief Complaint   Patient presents with     COMPREHENSIVE EYE EXAM      Accompanied by mother  Last Eye Exam: 2 years  Dilated Previously: Yes    What are you currently using to see?  does not use glasses or contacts       Distance Vision Acuity: Satisfied with vision    Near Vision Acuity: Satisfied with vision while reading without glasses    Eye Comfort: good  Do you use eye drops? : No  Occupation or Hobbies: [employer]     Corrine Solorio Optometric Assistant, A.B.O.C.          Medical, surgical and family histories reviewed and updated 3/23/2018.       OBJECTIVE: See Ophthalmology exam    ASSESSMENT:    ICD-10-CM    1. Exotropia of left eye H50.10 EYE EXAM (SIMPLE-NONBILLABLE)   2. History of ptosis repair Z98.890 EYE EXAM (SIMPLE-NONBILLABLE)   3. Regular astigmatism of both eyes H52.223 REFRACTION      PLAN:     Patient Instructions   Monitor exotropia.    No glasses recommended.    Return in 1 year for a complete eye exam or sooner if needed.    Matt Caldwell, OD

## 2018-03-23 NOTE — PATIENT INSTRUCTIONS
Monitor exotropia.    No glasses recommended.    Return in 1 year for a complete eye exam or sooner if needed.    Matt Caldwell, OD

## 2018-03-23 NOTE — LETTER
3/23/2018         RE: Ge Gregory  [address]  [facility] [address]        Dear Colleague,    Thank you for referring your patient, Ge Gregory, to the [facility]. Please see a copy of my visit note below.    Chief Complaint   Patient presents with     COMPREHENSIVE EYE EXAM      Accompanied by mother  Last Eye Exam: 2 years  Dilated Previously: Yes    What are you currently using to see?  does not use glasses or contacts       Distance Vision Acuity: Satisfied with vision    Near Vision Acuity: Satisfied with vision while reading without glasses    Eye Comfort: good  Do you use eye drops? : No  Occupation or Hobbies: [employer]     Corrine Solorio Optometric Assistant, A.B.O.C.          Medical, surgical and family histories reviewed and updated 3/23/2018.       OBJECTIVE: See Ophthalmology exam    ASSESSMENT:    ICD-10-CM    1. Exotropia of left eye H50.10 EYE EXAM (SIMPLE-NONBILLABLE)   2. History of ptosis repair Z98.890 EYE EXAM (SIMPLE-NONBILLABLE)   3. Regular astigmatism of both eyes H52.223 REFRACTION      PLAN:     Patient Instructions   Monitor exotropia.    No glasses recommended.    Return in 1 year for a complete eye exam or sooner if needed.    Matt Caldwell, GENARO           Again, thank you for allowing me to participate in the care of your patient.        Sincerely,        Matt Caldwell, OD

## 2018-12-07 ENCOUNTER — HOSPITAL ENCOUNTER (OUTPATIENT)
Facility: CLINIC | Age: 28
End: 2018-12-07
Attending: DENTIST | Admitting: DENTIST
Payer: MEDICAID

## 2019-05-08 ENCOUNTER — ANESTHESIA EVENT (OUTPATIENT)
Dept: SURGERY | Facility: CLINIC | Age: 29
End: 2019-05-08
Payer: MEDICAID

## 2019-05-09 ENCOUNTER — ANESTHESIA (OUTPATIENT)
Dept: SURGERY | Facility: CLINIC | Age: 29
End: 2019-05-09
Payer: MEDICAID

## 2019-05-09 ENCOUNTER — HOSPITAL ENCOUNTER (OUTPATIENT)
Facility: CLINIC | Age: 29
Discharge: HOME | End: 2019-05-09
Attending: DENTIST | Admitting: DENTIST
Payer: MEDICAID

## 2019-05-09 VITALS
TEMPERATURE: 97.7 F | HEART RATE: 98 BPM | BODY MASS INDEX: 36.38 KG/M2 | DIASTOLIC BLOOD PRESSURE: 79 MMHG | WEIGHT: 274.47 LBS | RESPIRATION RATE: 11 BRPM | HEIGHT: 73 IN | SYSTOLIC BLOOD PRESSURE: 126 MMHG | OXYGEN SATURATION: 96 %

## 2019-05-09 DIAGNOSIS — Z91.843 AT HIGH RISK FOR DENTAL CARIES: Primary | ICD-10-CM

## 2019-05-09 PROCEDURE — 25000125 ZZHC RX 250: Performed by: NURSE ANESTHETIST, CERTIFIED REGISTERED

## 2019-05-09 PROCEDURE — 71000027 ZZH RECOVERY PHASE 2 EACH 15 MINS: Performed by: DENTIST

## 2019-05-09 PROCEDURE — 71000014 ZZH RECOVERY PHASE 1 LEVEL 2 FIRST HR: Performed by: DENTIST

## 2019-05-09 PROCEDURE — 36000053 ZZH SURGERY LEVEL 2 EA 15 ADDTL MIN - UMMC: Performed by: DENTIST

## 2019-05-09 PROCEDURE — 40000170 ZZH STATISTIC PRE-PROCEDURE ASSESSMENT II: Performed by: DENTIST

## 2019-05-09 PROCEDURE — 36000051 ZZH SURGERY LEVEL 2 1ST 30 MIN - UMMC: Performed by: DENTIST

## 2019-05-09 PROCEDURE — 37000008 ZZH ANESTHESIA TECHNICAL FEE, 1ST 30 MIN: Performed by: DENTIST

## 2019-05-09 PROCEDURE — 25000566 ZZH SEVOFLURANE, EA 15 MIN: Performed by: DENTIST

## 2019-05-09 PROCEDURE — 25000132 ZZH RX MED GY IP 250 OP 250 PS 637: Performed by: DENTIST

## 2019-05-09 PROCEDURE — 25800030 ZZH RX IP 258 OP 636: Performed by: ANESTHESIOLOGY

## 2019-05-09 PROCEDURE — 25800030 ZZH RX IP 258 OP 636: Performed by: NURSE ANESTHETIST, CERTIFIED REGISTERED

## 2019-05-09 PROCEDURE — 25000128 H RX IP 250 OP 636: Performed by: NURSE ANESTHETIST, CERTIFIED REGISTERED

## 2019-05-09 PROCEDURE — 37000009 ZZH ANESTHESIA TECHNICAL FEE, EACH ADDTL 15 MIN: Performed by: DENTIST

## 2019-05-09 PROCEDURE — 25000128 H RX IP 250 OP 636: Performed by: DENTIST

## 2019-05-09 RX ORDER — FENTANYL CITRATE 50 UG/ML
25-50 INJECTION, SOLUTION INTRAMUSCULAR; INTRAVENOUS
Status: DISCONTINUED | OUTPATIENT
Start: 2019-05-09 | End: 2019-05-09 | Stop reason: HOSPADM

## 2019-05-09 RX ORDER — OXYMETAZOLINE HYDROCHLORIDE 0.05 G/100ML
SPRAY NASAL PRN
Status: DISCONTINUED | OUTPATIENT
Start: 2019-05-09 | End: 2019-05-09

## 2019-05-09 RX ORDER — PROPOFOL 10 MG/ML
INJECTION, EMULSION INTRAVENOUS PRN
Status: DISCONTINUED | OUTPATIENT
Start: 2019-05-09 | End: 2019-05-09

## 2019-05-09 RX ORDER — NALOXONE HYDROCHLORIDE 0.4 MG/ML
.1-.4 INJECTION, SOLUTION INTRAMUSCULAR; INTRAVENOUS; SUBCUTANEOUS
Status: DISCONTINUED | OUTPATIENT
Start: 2019-05-09 | End: 2019-05-09 | Stop reason: HOSPADM

## 2019-05-09 RX ORDER — ONDANSETRON 4 MG/1
4 TABLET, ORALLY DISINTEGRATING ORAL EVERY 30 MIN PRN
Status: DISCONTINUED | OUTPATIENT
Start: 2019-05-09 | End: 2019-05-09 | Stop reason: HOSPADM

## 2019-05-09 RX ORDER — ONDANSETRON 2 MG/ML
INJECTION INTRAMUSCULAR; INTRAVENOUS PRN
Status: DISCONTINUED | OUTPATIENT
Start: 2019-05-09 | End: 2019-05-09

## 2019-05-09 RX ORDER — LIDOCAINE HYDROCHLORIDE 20 MG/ML
INJECTION, SOLUTION INFILTRATION; PERINEURAL PRN
Status: DISCONTINUED | OUTPATIENT
Start: 2019-05-09 | End: 2019-05-09

## 2019-05-09 RX ORDER — CEFAZOLIN SODIUM 1 G/3ML
1 INJECTION, POWDER, FOR SOLUTION INTRAMUSCULAR; INTRAVENOUS SEE ADMIN INSTRUCTIONS
Status: DISCONTINUED | OUTPATIENT
Start: 2019-05-09 | End: 2019-05-09 | Stop reason: HOSPADM

## 2019-05-09 RX ORDER — MEPERIDINE HYDROCHLORIDE 25 MG/ML
12.5 INJECTION INTRAMUSCULAR; INTRAVENOUS; SUBCUTANEOUS
Status: DISCONTINUED | OUTPATIENT
Start: 2019-05-09 | End: 2019-05-09 | Stop reason: HOSPADM

## 2019-05-09 RX ORDER — DEXAMETHASONE SODIUM PHOSPHATE 4 MG/ML
INJECTION, SOLUTION INTRA-ARTICULAR; INTRALESIONAL; INTRAMUSCULAR; INTRAVENOUS; SOFT TISSUE PRN
Status: DISCONTINUED | OUTPATIENT
Start: 2019-05-09 | End: 2019-05-09

## 2019-05-09 RX ORDER — FENTANYL CITRATE 50 UG/ML
INJECTION, SOLUTION INTRAMUSCULAR; INTRAVENOUS PRN
Status: DISCONTINUED | OUTPATIENT
Start: 2019-05-09 | End: 2019-05-09

## 2019-05-09 RX ORDER — SODIUM CHLORIDE, SODIUM LACTATE, POTASSIUM CHLORIDE, CALCIUM CHLORIDE 600; 310; 30; 20 MG/100ML; MG/100ML; MG/100ML; MG/100ML
INJECTION, SOLUTION INTRAVENOUS CONTINUOUS
Status: DISCONTINUED | OUTPATIENT
Start: 2019-05-09 | End: 2019-05-09 | Stop reason: HOSPADM

## 2019-05-09 RX ORDER — CHLORHEXIDINE GLUCONATE ORAL RINSE 1.2 MG/ML
SOLUTION DENTAL PRN
Status: DISCONTINUED | OUTPATIENT
Start: 2019-05-09 | End: 2019-05-09 | Stop reason: HOSPADM

## 2019-05-09 RX ORDER — CEFAZOLIN SODIUM IN 0.9 % NACL 3 G/100 ML
3 INTRAVENOUS SOLUTION, PIGGYBACK (ML) INTRAVENOUS
Status: COMPLETED | OUTPATIENT
Start: 2019-05-09 | End: 2019-05-09

## 2019-05-09 RX ORDER — KETOROLAC TROMETHAMINE 30 MG/ML
INJECTION, SOLUTION INTRAMUSCULAR; INTRAVENOUS PRN
Status: DISCONTINUED | OUTPATIENT
Start: 2019-05-09 | End: 2019-05-09

## 2019-05-09 RX ORDER — ONDANSETRON 2 MG/ML
4 INJECTION INTRAMUSCULAR; INTRAVENOUS EVERY 30 MIN PRN
Status: DISCONTINUED | OUTPATIENT
Start: 2019-05-09 | End: 2019-05-09 | Stop reason: HOSPADM

## 2019-05-09 RX ADMIN — ONDANSETRON 4 MG: 2 INJECTION INTRAMUSCULAR; INTRAVENOUS at 10:35

## 2019-05-09 RX ADMIN — PROPOFOL 100 MG: 10 INJECTION, EMULSION INTRAVENOUS at 07:41

## 2019-05-09 RX ADMIN — OXYMETAZOLINE HYDROCHLORIDE 1 SPRAY: 5 SPRAY NASAL at 07:52

## 2019-05-09 RX ADMIN — DEXAMETHASONE SODIUM PHOSPHATE 8 MG: 4 INJECTION, SOLUTION INTRAMUSCULAR; INTRAVENOUS at 08:30

## 2019-05-09 RX ADMIN — DEXMEDETOMIDINE HYDROCHLORIDE 20 MCG: 100 INJECTION, SOLUTION INTRAVENOUS at 07:41

## 2019-05-09 RX ADMIN — ROCURONIUM BROMIDE 30 MG: 10 INJECTION INTRAVENOUS at 08:46

## 2019-05-09 RX ADMIN — Medication 3 G: at 08:10

## 2019-05-09 RX ADMIN — MIDAZOLAM 2 MG: 1 INJECTION INTRAMUSCULAR; INTRAVENOUS at 07:40

## 2019-05-09 RX ADMIN — Medication 100 MG: at 07:41

## 2019-05-09 RX ADMIN — SUGAMMADEX 240 MG: 100 INJECTION, SOLUTION INTRAVENOUS at 10:47

## 2019-05-09 RX ADMIN — Medication 1 G: at 10:07

## 2019-05-09 RX ADMIN — SODIUM CHLORIDE, POTASSIUM CHLORIDE, SODIUM LACTATE AND CALCIUM CHLORIDE: 600; 310; 30; 20 INJECTION, SOLUTION INTRAVENOUS at 07:45

## 2019-05-09 RX ADMIN — FENTANYL CITRATE 100 MCG: 50 INJECTION, SOLUTION INTRAMUSCULAR; INTRAVENOUS at 07:40

## 2019-05-09 RX ADMIN — KETOROLAC TROMETHAMINE 30 MG: 30 INJECTION, SOLUTION INTRAMUSCULAR at 10:36

## 2019-05-09 RX ADMIN — LIDOCAINE HYDROCHLORIDE 100 MG: 20 INJECTION, SOLUTION INFILTRATION; PERINEURAL at 07:40

## 2019-05-09 ASSESSMENT — MIFFLIN-ST. JEOR: SCORE: 2268.88

## 2019-05-09 NOTE — DISCHARGE INSTRUCTIONS
[facility]   Adult Discharge Orders & Instructions     For 24 hours after surgery    1. Get plenty of rest.  A responsible adult must stay with you for at least 24 hours after you leave the hospital.   2. Do not drive or use heavy equipment.  If you have weakness or tingling, don't drive or use heavy equipment until this feeling goes away.  3. Do not drink alcohol.  4. Avoid strenuous or risky activities.  Ask for help when climbing stairs.   5. You may feel lightheaded.  IF so, sit for a few minutes before standing.  Have someone help you get up.   6. If you have nausea (feel sick to your stomach): Drink only clear liquids such as apple juice, ginger ale, broth or 7-Up.  Rest may also help.  Be sure to drink enough fluids.  Move to a regular diet as you feel able.  7. You may have a slight fever. Call the doctor if your fever is over 100 F (37.7 C) (taken under the tongue) or lasts longer than 24 hours.  8. You may have a dry mouth, a sore throat, muscle aches or trouble sleeping.  These should go away after 24 hours.  9. Do not make important or legal decisions.   Call your doctor for any of the followin.  Signs of infection (fever, growing tenderness at the surgery site, a large amount of drainage or bleeding, severe pain, foul-smelling drainage, redness, swelling).    2. It has been over 8 to 10 hours since surgery and you are still not able to urinate (pass water).    3.  Headache for over 24 hours.    4.  Numbness, tingling or weakness the day after surgery (if you had spinal anesthesia).  To contact a doctor, call ________________________________________

## 2019-05-09 NOTE — ADDENDUM NOTE
Addendum  created 05/09/19 1257 by Sheron Wellington APRN CRNA    Intraprocedure Flowsheets edited, Intraprocedure LDAs edited, LDA properties accepted

## 2019-05-09 NOTE — BRIEF OP NOTE
General acute hospital, [place]    Brief Operative Note    Pre-operative diagnosis: Dental Caries, Periodontal Disease   Post-operative diagnosis Dental caries and periodontal disease  Procedure: Procedure(s):  Bilateral Dental Exam, Radiographs, Restorations x 8, Periodontal Therapy, and Fluoride Treatment  Surgeon: Surgeon(s) and Role:     * Dunia Castro DDS - Primary     * Gisela Pascal DMD - Resident - Assisting     * Luisa Middleton D4- Dental Student - Observing  Anesthesia: General NETT   Estimated blood loss: 5 mL  Drains:  None  Specimens: No specimens   Findings:   None.  Complications: None.  Implants:  No implants    The patient was extubated in the OR and taken to the PACU in good condition.

## 2019-05-09 NOTE — ANESTHESIA CARE TRANSFER NOTE
Patient: Ge Gregory    Procedure(s):  Right/Left Dental Exam, Radiographs, Eight Restorations, Periodontal Therapy, and Fluoride Treatment    Diagnosis: Dental Caries, Periodontal Disease   Diagnosis Additional Information: No value filed.    Anesthesia Type:   No value filed.     Note:  Airway :Face Mask  Patient transferred to:PACU  Comments: Patient is awake,resting calmly on cart. Taking off FM O2 upon arrival. VSS, normothermic. IV is patent. Report to RN.Handoff Report: Identifed the Patient, Identified the Reponsible Provider, Reviewed the pertinent medical history, Discussed the surgical course, Reviewed Intra-OP anesthesia mangement and issues during anesthesia, Set expectations for post-procedure period and Allowed opportunity for questions and acknowledgement of understanding      Vitals: (Last set prior to Anesthesia Care Transfer)    CRNA VITALS  5/9/2019 1026 - 5/9/2019 1105      5/9/2019             NIBP:  137/62    Pulse:  109    NIBP Mean:  100    Temp:  36.6  C (97.9  F)    SpO2:  96 %    Resp Rate (observed):  16                Electronically Signed By: ASHLEY Izquierdo CRNA  May 9, 2019  11:05 AM

## 2019-05-09 NOTE — ANESTHESIA POSTPROCEDURE EVALUATION
Anesthesia POST Procedure Evaluation    Patient: Ge Gregory   MRN:     [id number] Gender:   male   Age:    29 year old :      1990        Preoperative Diagnosis: Dental Caries, Periodontal Disease    Procedure(s):  Right/Left Dental Exam, Radiographs, Eight Restorations, Periodontal Therapy, and Fluoride Treatment   Postop Comments: No value filed.       Anesthesia Type:  General  No value filed.    Reportable Event: NO     PAIN: Uncomplicated   Sign Out status: Comfortable, Well controlled pain     PONV: No PONV   Sign Out status:  No Nausea or Vomiting     Neuro/Psych: Uneventful perioperative course   Sign Out Status: Preoperative baseline; Age appropriate mentation     Airway/Resp.: Uneventful perioperative course   Sign Out Status: Non labored breathing, age appropriate RR; Resp. Status within EXPECTED Parameters     CV: Uneventful perioperative course   Sign Out status: Appropriate BP and perfusion indices; Appropriate HR/Rhythm     Disposition:   Sign Out in:  PACU  Disposition:  Phase II; Home  Recovery Course: Uneventful  Follow-Up: Not required     Comments/Narrative:  The patient did very well. No apparent complications from anesthesia.  At the time of sign out the patient was sitting in the wheelchair and ready to go.  Parents were also present during the evaluation.               Last Anesthesia Record Vitals:  CRNA VITALS  2019 1026 - 2019 1126      2019             NIBP:  137/62    Pulse:  109    NIBP Mean:  100    Temp:  36.6  C (97.9  F)    SpO2:  96 %    Resp Rate (observed):  16          Last PACU Vitals:  Vitals Value Taken Time   /79 2019 11:45 AM   Temp 36.6  C (97.9  F) 2019 11:30 AM   Pulse 98 2019 11:30 AM   Resp 10 2019 11:54 AM   SpO2 88 % 2019 11:55 AM   Temp src     NIBP 137/62 2019 11:00 AM   Pulse 109 2019 11:00 AM   SpO2 96 % 2019 11:00 AM   Resp     Temp 36.6  C (97.9  F) 2019 11:00 AM   Ht Rate     Temp 2     Vitals  shown include unvalidated device data.      Electronically Signed By: Kristi Middleton MD, May 9, 2019, 12:13 PM

## 2019-05-10 NOTE — OP NOTE
Procedure Date: 05/09/2019      It was deemed necessary for this patient to be seen in the hospital operating room because he has autism and the inability to be treated in a traditional dental clinical setting.      Under general anesthesia, the following operations were performed in the mouth:     1.  A bilateral dental examination.   2.  Dental radiographs.   3.  Prophylaxis.   4.  Dental restorations x 8.   5.  Fluoride varnish application.      ATTENDING PHYSICIAN:  Dunia Castro DDS.       FIRST ASSISTANT DENTAL RESIDENT:  Gisela Don DMD.    DENTAL STUDENT OBSERVER: Luisa CHAVES       ANESTHESIOLOGIST:  Dr. Middleton.       CIRCULATING NURSE:  Luis.      SCRUB NURSE:  Cata.       CRNA:  Sheron.      PREOPERATIVE DIAGNOSIS:  Suspected periodontal disease and dental caries.      POSTOPERATIVE DIAGNOSES:  Plaque-induced gingivitis and dental caries.      ANESTHESIA:  General.      DESCRIPTION OF PROCEDURE:  The patient was brought into the operating room and draped in the usual customary [facility] [place] fashion.  Following the timeout procedures,  general anesthesia was administered through the patient's right naris.  EXAMINATION:  A bilateral dental exam was performed and 4 bitewings and 3 periapical radiographs were obtained and interpreted.  A moist throat pack was placed at 8:45 a.m.  Clinical examination revealed multiple decayed teeth, generalized heavy plaque and moderate calculus, generalized heavy bleeding on probing, periodontal pockets ranging from 3-5 mm.  Radiographic examination revealed normal bone trabeculation and several coronal radiolucencies consistent with dental caries on tooth #s 12, 13, 15, 18, 28, 29, 30 and 31.        PROCEDURES:  The following procedures were performed:  Prophylaxis was performed on all teeth using ultrasonic debridement, then rubber cup polishing and flossing.  The material used for dental restorations was Ketac Ruben  Glass-Ionomer material placed on tooth #s 13 distal buccal, 15 distal buccal, 18 mesiobuccal, 28 distal buccal, 29 mesiobuccal distal, 30 mesiobuccal, 31 buccal, and Fuji 2 light cure resin modified glass ionomer was used on #12 distal occlusal buccal.  Then, fluoride varnish was applied to all teeth.  The throat pack was removed with suction at 10:44 a.m.  The oropharynx was inspected and found to be cleared.  Estimated blood loss was 5 mL.  The attending doctor, Dr. Castro, was present for the entire procedure.  General anesthesia finished.  The patient was extubated in the operating room and taken to the postanesthesia care unit in good condition.         NILESH CASTRO DDS       As dictated by RESHMA LAZO DMD            D: 2019   T: 2019   MT:       Name:     ROBERTO COSTA   MRN:      [id number]        Account:        [id number]   :      1990           Procedure Date: 2019      Document: [id number]       cc: Missy Thompson MD

## 2019-10-03 ENCOUNTER — HEALTH MAINTENANCE LETTER (OUTPATIENT)
Age: 29
End: 2019-10-03

## 2020-11-07 ENCOUNTER — HEALTH MAINTENANCE LETTER (OUTPATIENT)
Age: 30
End: 2020-11-07

## 2021-05-19 ENCOUNTER — HOSPITAL ENCOUNTER (OUTPATIENT)
Facility: CLINIC | Age: 31
End: 2021-05-19
Attending: DENTIST | Admitting: DENTIST
Payer: MEDICAID

## 2021-05-19 DIAGNOSIS — Z11.59 ENCOUNTER FOR SCREENING FOR OTHER VIRAL DISEASES: ICD-10-CM

## 2021-06-14 ENCOUNTER — OFFICE VISIT (OUTPATIENT)
Dept: OPHTHALMOLOGY | Facility: CLINIC | Age: 31
End: 2021-06-14
Payer: MEDICAID

## 2021-06-14 DIAGNOSIS — H52.223 REGULAR ASTIGMATISM OF BOTH EYES: Primary | ICD-10-CM

## 2021-06-14 DIAGNOSIS — H50.112 EXOTROPIA OF LEFT EYE: ICD-10-CM

## 2021-06-14 DIAGNOSIS — Z98.890 HISTORY OF PTOSIS REPAIR: ICD-10-CM

## 2021-06-14 DIAGNOSIS — H17.9 BILATERAL CORNEA SCARS: ICD-10-CM

## 2021-06-14 PROCEDURE — 92004 COMPRE OPH EXAM NEW PT 1/>: CPT | Performed by: OPHTHALMOLOGY

## 2021-06-14 ASSESSMENT — SLIT LAMP EXAM - LIDS: COMMENTS: NORMAL

## 2021-06-14 ASSESSMENT — REFRACTION_MANIFEST
OD_CYLINDER: +0.50
OS_CYLINDER: +0.50
OS_SPHERE: PLANO
OD_AXIS: 045
OS_AXIS: 120
OD_SPHERE: PLANO

## 2021-06-14 ASSESSMENT — TONOMETRY
IOP_METHOD: ICARE
IOP_UNABLETOASSESS: 1
OS_IOP_MMHG: 13
OD_IOP_MMHG: 11

## 2021-06-14 ASSESSMENT — CUP TO DISC RATIO
OD_RATIO: 0.25
OS_RATIO: 0.25

## 2021-06-14 ASSESSMENT — VISUAL ACUITY
OS_SC: 20/40
OD_SC: 20/40
METHOD_MR_RETINOSCOPY: 1
METHOD: LEA - BLOCKED

## 2021-06-14 ASSESSMENT — EXTERNAL EXAM - RIGHT EYE: OD_EXAM: NORMAL

## 2021-06-14 NOTE — PROGRESS NOTES
HPI:  Ge Gregory is a 31 year old male presenting for complete eye exam.    Last eye exam 2019 with Dr. Caldwell was grossly normal. Minimal Rx. Not wearing glasses.     Here with mom (Olesya). No changes in vision. Seems to see TV as well as usual per mom. No eye pain. No complaints. No double vision. Mom has not noticed eyes drifting unusually.    Past Ocular History:  L frontalis sling 2001  [facility], RSRc 2001  [facility], BCRc 1995    PMH:  Developmental delay    SH:  Lives at home with mom    FH:  Maternal aunt - macular degeneration    ASSESSMENT and PLAN:  1. Regular astigmatism of both eyes  - limited [facility] testing in setting of developmental delay, unreliable responses  - retinoscopy with minimal Rx  - discussed with mother  - defer glasses and monitor    2. Bilateral cornea scars  - noted on exam 6/14/21  - no infiltrates, no overlying epi defect  - stable vision  - no eye pain, no redness, few PEE  - discussed mild dryness and ATs prn  - monitor    3. History of ptosis repair  - s/p L frontalis sling with Dr. Rivera 2001  - good lid excursion with brow lifting  - monitor    4. Exotropia  - history of strabismus surgeries in 1995 and 2001  - left variable exotropia  - no double vision  - monitor    History and examination limited by patient developmental delay, variable responses and variably following instructions      Follow up in 1 year or sooner PRN        -----------------------------------------------------------------------------------    Attestation:  Complete documentation of historical and exam elements from today's encounter can be found in the full encounter summary report (not reduplicated in this progress note). I personally obtained the chief complaint(s) and history of present illness.  I confirmed and edited as necessary the review of systems, past medical/surgical history, family history, social history, and examination findings as documented by others; and I examined the patient myself. I  personally reviewed the relevant tests, images, and reports as documented above.     I formulated and edited as necessary the assessment and plan and discussed the findings and management plan with the patient and family.      Keerthi Hoyos MD

## 2021-06-14 NOTE — NURSING NOTE
Chief Complaints and History of Present Illnesses   Patient presents with     Annual Eye Exam       Chief Complaint(s) and History of Present Illness(es)     Annual Eye Exam     Laterality: both eyes              Comments     Patient here for annual eye exam. History of ptosis repair.  Exotropia of left eye. No glasses worn currently. Family hx with needing glasses, wanting to make sure he is still doing okay without them. Watches movies a lot. Patient states no complaints about his vision.     Patient is here with guardian.                 Kirk Ding, Ophthalmic Assistant

## 2021-09-05 ENCOUNTER — HEALTH MAINTENANCE LETTER (OUTPATIENT)
Age: 31
End: 2021-09-05

## 2021-12-26 ENCOUNTER — HEALTH MAINTENANCE LETTER (OUTPATIENT)
Age: 31
End: 2021-12-26

## 2022-06-17 ENCOUNTER — OFFICE VISIT (OUTPATIENT)
Dept: OPTOMETRY | Facility: CLINIC | Age: 32
End: 2022-06-17
Payer: MEDICAID

## 2022-06-17 DIAGNOSIS — H17.9 BILATERAL CORNEAL SCARS: ICD-10-CM

## 2022-06-17 DIAGNOSIS — H50.10 EXOTROPIA: ICD-10-CM

## 2022-06-17 DIAGNOSIS — H52.223 REGULAR ASTIGMATISM OF BOTH EYES: Primary | ICD-10-CM

## 2022-06-17 DIAGNOSIS — Z98.890 HISTORY OF PTOSIS REPAIR: ICD-10-CM

## 2022-06-17 PROCEDURE — 92004 COMPRE OPH EXAM NEW PT 1/>: CPT | Performed by: OPTOMETRIST

## 2022-06-17 ASSESSMENT — VISUAL ACUITY
METHOD: ALLEN PICTURES
OD_SC: 20/50
OS_SC: 20/50

## 2022-06-17 ASSESSMENT — SLIT LAMP EXAM - LIDS: COMMENTS: NORMAL

## 2022-06-17 ASSESSMENT — EXTERNAL EXAM - RIGHT EYE: OD_EXAM: NORMAL

## 2022-06-17 ASSESSMENT — CUP TO DISC RATIO
OS_RATIO: 0.25
OD_RATIO: 0.25

## 2022-06-17 NOTE — PROGRESS NOTES
Assessment/Plan  (H52.223) Regular astigmatism of both eyes  (primary encounter diagnosis)  Comment: Stable uncorrected vision  Plan: No glasses needed at this time. Monitor annually.     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 These have been noted on past exams   Plan: Monitor each year     (Z98.890) History of ptosis repair  Comment: History of repair with Dr. Rivera  Plan: Monitor annually     (H50.10) Exotropia  Comment: History of strabismus surgery, no diplopia reported  Plan: Monitor.     Complete documentation of historical and exam elements from today's encounter can  be found in the full encounter summary report (not reduplicated in this progress  note). I personally obtained the chief complaint(s) and history of present illness. I  confirmed and edited as necessary the review of systems, past medical/surgical  history, family history, social history, and examination findings as documented by  others; and I examined the patient myself. I personally reviewed the relevant tests,  images, and reports as documented above. I formulated and edited as necessary the  assessment and plan and discussed the findings and management plan with the  patient and family.    Tab Crespo, OD

## 2022-06-17 NOTE — NURSING NOTE
Chief Complaints and History of Present Illnesses   Patient presents with     COMPREHENSIVE EYE EXAM     Chief Complaint(s) and History of Present Illness(es)     COMPREHENSIVE EYE EXAM               Comments     Patient notes vision is good. No pain in either eye.                MOMO Villagran  1:26 PM 06/17/2022

## 2022-10-29 ENCOUNTER — HEALTH MAINTENANCE LETTER (OUTPATIENT)
Age: 32
End: 2022-10-29

## 2023-02-02 ENCOUNTER — DOCUMENTATION ONLY (OUTPATIENT)
Dept: OTHER | Facility: CLINIC | Age: 33
End: 2023-02-02
Payer: MEDICAID

## 2023-02-15 ENCOUNTER — ANESTHESIA EVENT (OUTPATIENT)
Dept: SURGERY | Facility: CLINIC | Age: 33
End: 2023-02-15
Payer: MEDICAID

## 2023-02-16 ENCOUNTER — ANESTHESIA (OUTPATIENT)
Dept: SURGERY | Facility: CLINIC | Age: 33
End: 2023-02-16
Payer: MEDICAID

## 2023-02-16 ENCOUNTER — HOSPITAL ENCOUNTER (OUTPATIENT)
Facility: CLINIC | Age: 33
Discharge: HOME | End: 2023-02-16
Attending: DENTIST | Admitting: DENTIST
Payer: MEDICAID

## 2023-02-16 VITALS
HEART RATE: 96 BPM | WEIGHT: 263.67 LBS | DIASTOLIC BLOOD PRESSURE: 87 MMHG | RESPIRATION RATE: 17 BRPM | TEMPERATURE: 99 F | SYSTOLIC BLOOD PRESSURE: 147 MMHG | OXYGEN SATURATION: 94 % | BODY MASS INDEX: 34.95 KG/M2 | HEIGHT: 73 IN

## 2023-02-16 DIAGNOSIS — G89.18 POST-OP PAIN: Primary | ICD-10-CM

## 2023-02-16 PROCEDURE — 250N000009 HC RX 250: Performed by: DENTIST

## 2023-02-16 PROCEDURE — 250N000011 HC RX IP 250 OP 636: Performed by: NURSE ANESTHETIST, CERTIFIED REGISTERED

## 2023-02-16 PROCEDURE — 258N000003 HC RX IP 258 OP 636: Performed by: NURSE ANESTHETIST, CERTIFIED REGISTERED

## 2023-02-16 PROCEDURE — 370N000017 HC ANESTHESIA TECHNICAL FEE, PER MIN: Performed by: DENTIST

## 2023-02-16 PROCEDURE — 710N000012 HC RECOVERY PHASE 2, PER MINUTE: Performed by: DENTIST

## 2023-02-16 PROCEDURE — 360N000075 HC SURGERY LEVEL 2, PER MIN: Performed by: DENTIST

## 2023-02-16 PROCEDURE — 999N000141 HC STATISTIC PRE-PROCEDURE NURSING ASSESSMENT: Performed by: DENTIST

## 2023-02-16 PROCEDURE — 250N000009 HC RX 250: Performed by: NURSE ANESTHETIST, CERTIFIED REGISTERED

## 2023-02-16 PROCEDURE — 250N000025 HC SEVOFLURANE, PER MIN: Performed by: DENTIST

## 2023-02-16 PROCEDURE — 272N000001 HC OR GENERAL SUPPLY STERILE: Performed by: DENTIST

## 2023-02-16 PROCEDURE — 250N000013 HC RX MED GY IP 250 OP 250 PS 637: Performed by: DENTIST

## 2023-02-16 PROCEDURE — 710N000010 HC RECOVERY PHASE 1, LEVEL 2, PER MIN: Performed by: DENTIST

## 2023-02-16 RX ORDER — PROPOFOL 10 MG/ML
INJECTION, EMULSION INTRAVENOUS PRN
Status: DISCONTINUED | OUTPATIENT
Start: 2023-02-16 | End: 2023-02-16

## 2023-02-16 RX ORDER — BUPIVACAINE HYDROCHLORIDE AND EPINEPHRINE 5; 5 MG/ML; UG/ML
INJECTION, SOLUTION PERINEURAL PRN
Status: DISCONTINUED | OUTPATIENT
Start: 2023-02-16 | End: 2023-02-16 | Stop reason: HOSPADM

## 2023-02-16 RX ORDER — ONDANSETRON 2 MG/ML
INJECTION INTRAMUSCULAR; INTRAVENOUS PRN
Status: DISCONTINUED | OUTPATIENT
Start: 2023-02-16 | End: 2023-02-16

## 2023-02-16 RX ORDER — CHLORHEXIDINE GLUCONATE ORAL RINSE 1.2 MG/ML
SOLUTION DENTAL PRN
Status: DISCONTINUED | OUTPATIENT
Start: 2023-02-16 | End: 2023-02-16 | Stop reason: HOSPADM

## 2023-02-16 RX ORDER — OXYMETAZOLINE HYDROCHLORIDE 0.05 G/100ML
SPRAY NASAL PRN
Status: DISCONTINUED | OUTPATIENT
Start: 2023-02-16 | End: 2023-02-16

## 2023-02-16 RX ORDER — ACETAMINOPHEN 325 MG/1
650 TABLET ORAL EVERY 4 HOURS PRN
Qty: 25 TABLET | Refills: 0 | Status: SHIPPED | OUTPATIENT
Start: 2023-02-16

## 2023-02-16 RX ORDER — KETOROLAC TROMETHAMINE 30 MG/ML
INJECTION, SOLUTION INTRAMUSCULAR; INTRAVENOUS PRN
Status: DISCONTINUED | OUTPATIENT
Start: 2023-02-16 | End: 2023-02-16

## 2023-02-16 RX ORDER — OXYCODONE HYDROCHLORIDE 10 MG/1
10 TABLET ORAL EVERY 4 HOURS PRN
Status: DISCONTINUED | OUTPATIENT
Start: 2023-02-16 | End: 2023-02-16 | Stop reason: HOSPADM

## 2023-02-16 RX ORDER — LIDOCAINE HYDROCHLORIDE 20 MG/ML
INJECTION, SOLUTION INFILTRATION; PERINEURAL PRN
Status: DISCONTINUED | OUTPATIENT
Start: 2023-02-16 | End: 2023-02-16

## 2023-02-16 RX ORDER — OXYCODONE HYDROCHLORIDE 5 MG/1
5 TABLET ORAL EVERY 4 HOURS PRN
Status: DISCONTINUED | OUTPATIENT
Start: 2023-02-16 | End: 2023-02-16 | Stop reason: HOSPADM

## 2023-02-16 RX ORDER — DEXAMETHASONE SODIUM PHOSPHATE 4 MG/ML
INJECTION, SOLUTION INTRA-ARTICULAR; INTRALESIONAL; INTRAMUSCULAR; INTRAVENOUS; SOFT TISSUE PRN
Status: DISCONTINUED | OUTPATIENT
Start: 2023-02-16 | End: 2023-02-16

## 2023-02-16 RX ORDER — IBUPROFEN 600 MG/1
600 TABLET, FILM COATED ORAL EVERY 6 HOURS PRN
Qty: 20 TABLET | Refills: 0 | Status: SHIPPED | OUTPATIENT
Start: 2023-02-16

## 2023-02-16 RX ORDER — SODIUM CHLORIDE, SODIUM LACTATE, POTASSIUM CHLORIDE, CALCIUM CHLORIDE 600; 310; 30; 20 MG/100ML; MG/100ML; MG/100ML; MG/100ML
INJECTION, SOLUTION INTRAVENOUS CONTINUOUS PRN
Status: DISCONTINUED | OUTPATIENT
Start: 2023-02-16 | End: 2023-02-16

## 2023-02-16 RX ORDER — FENTANYL CITRATE 50 UG/ML
INJECTION, SOLUTION INTRAMUSCULAR; INTRAVENOUS PRN
Status: DISCONTINUED | OUTPATIENT
Start: 2023-02-16 | End: 2023-02-16

## 2023-02-16 RX ADMIN — FENTANYL CITRATE 100 MCG: 50 INJECTION, SOLUTION INTRAMUSCULAR; INTRAVENOUS at 07:27

## 2023-02-16 RX ADMIN — LIDOCAINE HYDROCHLORIDE 50 MG: 20 INJECTION, SOLUTION INFILTRATION; PERINEURAL at 07:29

## 2023-02-16 RX ADMIN — KETOROLAC TROMETHAMINE 15 MG: 30 INJECTION, SOLUTION INTRAMUSCULAR at 11:13

## 2023-02-16 RX ADMIN — ONDANSETRON 4 MG: 2 INJECTION INTRAMUSCULAR; INTRAVENOUS at 11:13

## 2023-02-16 RX ADMIN — MIDAZOLAM 2 MG: 1 INJECTION INTRAMUSCULAR; INTRAVENOUS at 07:27

## 2023-02-16 RX ADMIN — SODIUM CHLORIDE, POTASSIUM CHLORIDE, SODIUM LACTATE AND CALCIUM CHLORIDE: 600; 310; 30; 20 INJECTION, SOLUTION INTRAVENOUS at 07:30

## 2023-02-16 RX ADMIN — SUCCINYLCHOLINE CHLORIDE 200 MG: 20 INJECTION, SOLUTION INTRAMUSCULAR; INTRAVENOUS; PARENTERAL at 07:33

## 2023-02-16 RX ADMIN — SODIUM CHLORIDE, POTASSIUM CHLORIDE, SODIUM LACTATE AND CALCIUM CHLORIDE: 600; 310; 30; 20 INJECTION, SOLUTION INTRAVENOUS at 11:20

## 2023-02-16 RX ADMIN — MIDAZOLAM 2 MG: 1 INJECTION INTRAMUSCULAR; INTRAVENOUS at 07:44

## 2023-02-16 RX ADMIN — PROPOFOL 200 MG: 10 INJECTION, EMULSION INTRAVENOUS at 07:32

## 2023-02-16 RX ADMIN — OXYMETAZOLINE HYDROCHLORIDE 2 SPRAY: 0.05 SPRAY NASAL at 07:30

## 2023-02-16 RX ADMIN — DEXAMETHASONE SODIUM PHOSPHATE 4 MG: 4 INJECTION, SOLUTION INTRA-ARTICULAR; INTRALESIONAL; INTRAMUSCULAR; INTRAVENOUS; SOFT TISSUE at 07:40

## 2023-02-16 ASSESSMENT — ACTIVITIES OF DAILY LIVING (ADL)
ADLS_ACUITY_SCORE: 35

## 2023-02-16 NOTE — ANESTHESIA POSTPROCEDURE EVALUATION
Patient: Ge Gregory    Procedure: Procedure(s):  Bilateral dental exam, Dental radiographs, dental restorations X 5, dental extractions X 1, periodontal therapy, fluoride, varnish in the mouth,       Anesthesia Type:  General    Note:  Disposition: Outpatient   Postop Pain Control: Uneventful            Sign Out: Well controlled pain   PONV: No   Neuro/Psych: Uneventful            Sign Out: Acceptable/Baseline neuro status   Airway/Respiratory: Uneventful            Sign Out: Acceptable/Baseline resp. status   CV/Hemodynamics: Uneventful            Sign Out: Acceptable CV status; No obvious hypovolemia; No obvious fluid overload   Other NRE: NONE   DID A NON-ROUTINE EVENT OCCUR? No    Event details/Postop Comments:  Comfortable post procedure, sipping on popsicle prior to discharge.           Last vitals:  Vitals Value Taken Time   /87 02/16/23 1215   Temp 37.2  C (99  F) 02/16/23 1215   Pulse 96 02/16/23 1215   Resp 17 02/16/23 1215   SpO2 94 % 02/16/23 1215       Electronically Signed By: Deejay Higgins MD  February 16, 2023  12:37 PM

## 2023-02-16 NOTE — ANESTHESIA PREPROCEDURE EVALUATION
Anesthesia Pre-Procedure Evaluation    Patient: Ge Gregory   MRN: [id number] : 1990        Procedure : Procedure(s):  Bilateral dental exam, Dental radiographs, dental restorations, pulpotomy, root canal therapy, frenectomy, gingivectomy, Alveoloplasty, periodontal therapy, fluoride, varnish in the mouth, dental extractions          Past Medical History:   Diagnosis Date     Autism      Autism      Autistic spectrum disorder      Congenital ptosis      Deaf     left ear only     Dental caries      Development delay      Development delay     Global     Developmental delay      Developmental delay      Exotropia     left      Fetal alcohol effects     fetal alcohol exposure     Fetal alcohol syndrome      Hearing loss      Insomnia      PONV (postoperative nausea and vomiting)       Past Surgical History:   Procedure Laterality Date     dental surgeries       ear surgeries       EXAM UNDER ANESTHESIA, RESTORATIONS, EXTRACTION(S) DENTAL, COMBINED  2011    Procedure:COMBINED EXAM UNDER ANESTHESIA, RESTORATIONS, EXTRACTION(S) DENTAL; Biopsy and no extractions or alveoloplasty done; Surgeon:SOFIYA GALLEGOS; [facility]     EXAM UNDER ANESTHESIA, RESTORATIONS, EXTRACTION(S) DENTAL, COMBINED  10/23/2013    Procedure: COMBINED EXAM UNDER ANESTHESIA, RESTORATIONS, EXTRACTION(S) DENTAL;  Dental Exam, Under Anesthesia, Radiographs, Dental Restorations, Periodontal Therapy, Dental ExtractionsX1, Gingivectomy, Fluoride Varnish;  Surgeon: Sofiay Gallegos DDS;  [facility]     EXAM UNDER ANESTHESIA, RESTORATIONS, EXTRACTION(S) DENTAL, COMBINED N/A 2016    Procedure: COMBINED EXAM UNDER ANESTHESIA, RESTORATIONS, EXTRACTION(S) DENTAL;  Surgeon: Debra David DDS;  [facility]     EXAM UNDER ANESTHESIA, RESTORATIONS, EXTRACTION(S) DENTAL, COMBINED N/A 2017    Procedure: COMBINED EXAM UNDER ANESTHESIA, RESTORATIONS, EXTRACTION(S) DENTAL;  Dental Exam, Restorations x4,  Radiographs, Periodontal Therapy, fluoride treatment;  Surgeon: Wilfred Campos DDS;  [facility]     EXAM UNDER ANESTHESIA, RESTORATIONS, EXTRACTION(S) DENTAL, COMBINED N/A 5/9/2019    Procedure: Right/Left Dental Exam, Radiographs, Eight Restorations, Periodontal Therapy, and Fluoride Treatment;  Surgeon: Dunia Castro DDS;  [facility]     EYE SURGERY  12/05/2001    JON frontal sling Dr. Rivera     EYE SURGERY  01/17/2001    BMR, RSR      EYE SURGERY  6/95    [facility] Dr. Ybarra     PE TUBES       STRABISMUS SURGERY        No Known Allergies   Social History     Tobacco Use     Smoking status: Passive Smoke Exposure - Never Smoker     Smokeless tobacco: Never     Tobacco comments:     occasional exposure to second hand smoke   Substance Use Topics     Alcohol use: No      Wt Readings from Last 1 Encounters:   02/16/23 119.6 kg (263 lb 10.7 oz)        Anesthesia Evaluation   Pt has had prior anesthetic. Type: General.    History of anesthetic complications  - PONV.  as a child.    ROS/MED HX  ENT/Pulmonary:     (+) [place] risk factors, obese,     Neurologic:     (+) Developmental delay, level of function: Autism,     Cardiovascular:       METS/Exercise Tolerance:     Hematologic:       Musculoskeletal:       GI/Hepatic:       Renal/Genitourinary:       Endo:     (+) Obesity,     Psychiatric/Substance Use:       Infectious Disease:       Malignancy:       Other:            Physical Exam    Airway         TM distance: > 3 FB   Neck ROM: full   Mouth opening: > 3 cm    Respiratory Devices and Support         Dental     Comment: Prominent upper incisors        Cardiovascular   cardiovascular exam normal       Rhythm and rate: regular and normal     Pulmonary   pulmonary exam normal        breath sounds clear to auscultation           OUTSIDE LABS:  CBC:   Lab Results   Component Value Date    WBC 8.5 10/23/2013    WBC 7.6 07/06/2011    HGB 13.5 10/23/2013    HGB 13.2 (L) 07/06/2011    HCT 38.3  (L) 10/23/2013    HCT 38.3 (L) 07/06/2011     10/23/2013     (L) 07/06/2011     BMP:   Lab Results   Component Value Date    POTASSIUM 3.9 10/23/2013     COAGS:   Lab Results   Component Value Date    PTT 50 (H) 10/23/2013    INR 1.25 (H) 10/23/2013     POC: No results found for: BGM, HCG, HCGS  HEPATIC: No results found for: ALBUMIN, PROTTOTAL, ALT, AST, GGT, ALKPHOS, BILITOTAL, BILIDIRECT, AGUILA  OTHER: No results found for: PH, LACT, A1C, FERNANDO, PHOS, MAG, LIPASE, AMYLASE, TSH, T4, T3, CRP, SED    Anesthesia Plan    ASA Status:  3      Anesthesia Type: General.     - Airway: ETT   Induction: Intravenous.           Consents    Anesthesia Plan(s) and associated risks, benefits, and realistic alternatives discussed. Questions answered and patient/representative(s) expressed understanding.     - Discussed: Risks, Benefits and Alternatives for BOTH SEDATION and the PROCEDURE were discussed     - Discussed with:  Parent (Mother and/or Father)      - Extended Intubation/Ventilatory Support Discussed: No.      - Patient is DNR/DNI Status: No    Use of blood products discussed: No .     Postoperative Care       PONV prophylaxis: Ondansetron (or other 5HT-3), Dexamethasone or Solumedrol     Comments:    Other Comments: Obese BMI 35, autism            Deejay Higgins MD

## 2023-02-16 NOTE — DISCHARGE INSTRUCTIONS
Same-Day Surgery   Adult Discharge Orders & Instructions     For 24 hours after surgery:  Get plenty of rest.  A responsible adult must stay with you for at least 24 hours after you leave the hospital.   Pain medication can slow your reflexes. Do not drive or use heavy equipment.  If you have weakness or tingling, don't drive or use heavy equipment until this feeling goes away.  Mixing alcohol and pain medication can cause dizziness and slow your breathing. It can even be fatal. Do not drink alcohol while taking pain medication.  Avoid strenuous or risky activities.  Ask for help when climbing stairs.   You may feel lightheaded.  If so, sit for a few minutes before standing.  Have someone help you get up.   If you have nausea (feel sick to your stomach), drink only clear liquids such as apple juice, ginger ale, broth or 7-Up.  Rest may also help.  Be sure to drink enough fluids.  Move to a regular diet as you feel able. Take pain medications with a small amount of solid food, such as toast or crackers, to avoid nausea.   A slight fever is normal. Call the doctor if your fever is over 100 F (37.7 C) (taken under the tongue) or lasts longer than 24 hours.  You may have a dry mouth, muscle aches, trouble sleeping or a sore throat.  These symptoms should go away after 24 hours.  Do not make important or legal decisions.   Pain Management:      1. Take pain medication (if prescribed) for pain as directed by your physician.        2. WARNING: If the pain medication you have been prescribed contains Tylenol  (acetaminophen), DO NOT take additional doses of Tylenol (acetaminophen).     Call your doctor for any of the followin.  Signs of infection (fever, growing tenderness at the surgery site, severe pain, a large amount of drainage or bleeding, foul-smelling drainage, redness, swelling).    2.  It has been over 8 to 10 hours since surgery and you are still not able to urinate (pee).    3.  Headache for over 24  hours.      To contact a doctor, call Dr. Campos's clinic at the numbers listed above or:  '   [phone] and ask for the Resident On Call for:          Adult Dental Team (answered 24 hours a day)  '   Emergency Department:  [place] [facility]: [phone]  [place] [facility]: [phone]               Rev. 10/2014

## 2023-02-16 NOTE — ANESTHESIA PROCEDURE NOTES
Airway         Procedure Start/Stop Times: 2/16/2023 7:30 AM  Staff -        Anesthesiologist:  Deejay Higgins MD       CRNA: Cindy De La Rosa APRN CRNA       Performed By: CRNA  Consent for Airway        Urgency: elective  Indications and Patient Condition       Indications for airway management: brandon-procedural       Induction type:intravenous       Mask difficulty assessment: 1 - vent by mask    Final Airway Details       Final airway type: endotracheal airway       Successful airway: Nasal and TESSIE  Endotracheal Airway Details        Cuffed: yes       Inital cuff pressure (cm H2O): 20       Cuff volume (mL): 5       Successful intubation technique: direct laryngoscopy (attempted to use CMAC first but picture was unvailable.)       DL Blade Type: MAC 3       Grade View of Cords: 2       Position: Right       Measured from: nares       Secured at (cm): 30       Bite block used: None    Post intubation assessment        Placement verified by: capnometry, equal breath sounds and chest rise        Number of attempts at approach: 1       Secured with: silk tape       Ease of procedure: easy       Dentition: Intact and Unchanged    Medication(s) Administered   Medication Administration Time: 2/16/2023 7:30 AM

## 2023-02-16 NOTE — BRIEF OP NOTE
[facility]    Brief Operative Note    Pre-operative diagnosis: Dental caries [K02.9]  Dental infection [K04.7]  Unable to comply with treatment [Z91.199]  Post-operative diagnosis Same as pre-operative diagnosis    Procedure: Procedure(s):  Bilateral dental exam, Dental radiographs, dental restorations X 5, dental extractions X 1, periodontal therapy, fluoride, varnish in the mouth,  Surgeon: Surgeon(s) and Role:     * Wilfred Campos DDS - Primary     * Alphonso Kellogg MD - Resident - Assisting     * Larissa Cervantes MD - Resident - Assisting  Anesthesia: General   Estimated Blood Loss: 5 ml    Drains: None  Specimens: * No specimens in log *  Findings:   None.  Complications: None.  Implants: * No implants in log *

## 2023-02-16 NOTE — ANESTHESIA CARE TRANSFER NOTE
Patient: Ge Gregory    Procedure: Procedure(s):  Bilateral dental exam, Dental radiographs, dental restorations X 5, dental extractions X 1, periodontal therapy, fluoride, varnish in the mouth,       Diagnosis: Dental caries [K02.9]  Dental infection [K04.7]  Unable to comply with treatment [Z91.199]  Diagnosis Additional Information: No value filed.    Anesthesia Type:   General     Note:    Oropharynx: oropharynx clear of all foreign objects and spontaneously breathing  Level of Consciousness: drowsy  Oxygen Supplementation: face mask  Level of Supplemental Oxygen (L/min / FiO2): 6  Independent Airway: airway patency satisfactory and stable  Dentition: dentition unchanged  Vital Signs Stable: post-procedure vital signs reviewed and stable  Report to RN Given: handoff report given  Patient transferred to: PACU    Handoff Report: Identifed the Patient, Identified the Reponsible Provider, Reviewed the pertinent medical history, Discussed the surgical course, Reviewed Intra-OP anesthesia mangement and issues during anesthesia, Set expectations for post-procedure period and Allowed opportunity for questions and acknowledgement of understanding      Vitals:  Vitals Value Taken Time   BP     Temp     Pulse 109 02/16/23 1144   Resp 24 02/16/23 1144   SpO2 97 % 02/16/23 1144   Vitals shown include unvalidated device data.    Electronically Signed By: ASHLEY Nice CRNA  February 16, 2023  11:45 AM

## 2023-02-19 NOTE — OP NOTE
[facility] OR Dental Procedure Note    Treatment Completed   General Anesthesia Start:  Ge Gregory was brought into the operating room and draped in the usual customary [facility] fashion. Following the time-out procedures, the patient was placed under General Anesthesia Care via Right naris.    Under general anesthesia, the following operations were performed in the mouth:   Bilateral dental examination,   FMX radiographs were taken and reviewed.  Moist throat pack was placed, betadine applied circumferentially to oral cavity.   Pre-procedural rinse included: Chlorhexidine 0.12% solution followed by a 50/50 solution of sterile saline and Hydrogen Peroxide.  Periodontal Treatment: Full mouth prophylaxis: Bulk debridement completed with the Cavitron, followed use of hand scalers as necessary. Slow speed polish with medium grit polishing paste. All contacts flossed.      Composite Restoration(s):  Tooth #11--upper left canine , #13--upper left 2nd premolar, #19-lower left 1st molar, #30-lower right 1st molar and #31-lower right 2nd molar prepared with the electric hand pieces and hand instruments as necessary to remove decay and ensure retention of restoration, preparation cleansed with 38% Phosphoric etch, followed by two applications of 3M Scotchbond Universal adhesive, and restored with 3M Filtek composite shade A2, all materials used per product instructions.    Local Anesthesia:  1.7_cc  Lidocaine 2% w/ epi 1:100,000  1.7_cc  Marcaine 0.5% w/epi 1:100,000  Given via MSA nerve block, Buccal infiltration to anesthesize #12    Surgical extraction of tooth #12-upper left 1st premolar completed by laying flap along the alveolar ridge of #12 and extending to the Buccal of #11, tissue reflected with #9, removed buccal bone #12, followed by a series of elevators and forceps, alveoplasty completed, Surgicel placed in extraction site(s), 3-0 chromic gut suture placed.     Sodium Fluoride Varnish 5%  placed on remaining teeth.     Throat pack placed at: 0826  Throat pack removed at: 1113  The oropharynx was inspected and found to be clear.   Estimated blood loss: 2 (mL)      Dental procedure Finish:  After the dental procedure, the patient was transferred to recovery room in good condition.The patient s family was informed by the dental team about the dental findings and procedures performed. Verbal and written post-op instructions given. All questions and concerns were invited and answered, patient and patient's family left pleased with treatment.       Clinic contact information:   [facility]  [address] [place] [address] [phone]  ([facility] bldg[facility]     Pre - Procedure   Patient name: Ge Gregory  : 1990  Medical record number:  [id number]  Dental procedures performed on: 2023  It was deemed necessary for this patient to be seen in the hospital operating room because pt is not able to be seen in clinic due to: Developmental Delays    Pre-procedure with patient & guardian:   Consent: Risks complications including but not limited to post-operative pain, swelling, bleeding, infection, temporary/permanent paresthesia/anesthesia of CN V3, lingual nerve, failure to resolve chief complaint. Patient /'s guardian agrees to procedure as written and signed consent after all questions were invited and answered.    Providers     Dental attending:Dallin Campos DDS, [facility]   Dental resident Larissa Cervantes DDS, PGY-1  Dental resident: Alphonso Kellogg DDS, PGY-1  Anesthesiologist: Deejay Higgins  CRNA: Cindy De La Rosa  Scrub RN: Lamar/Liliana  Circulator RN: Devang      Patient review   Patient Health history: History is obtained from the patient's parent(s)  The 10 point Review of Systems is negative other than noted in the HPI and pertinent information mentioned above.     History of Present Illness:  Ge HORVATH   is a 32 year old male who presents to the OR for comprehensive dental treatment.     ASA 3 - Patient with moderate systemic disease with functional limitations    Physical Exam:  Vitals were reviewed                      Medical, Surgical, Social History       Past Medical History:   Diagnosis Date     Autism      Autism      Autistic spectrum disorder      Congenital ptosis      Deaf     left ear only     Dental caries      Development delay      Development delay     Global     Developmental delay      Developmental delay      Exotropia     left      Fetal alcohol effects     fetal alcohol exposure     Fetal alcohol syndrome      Hearing loss      Insomnia      PONV (postoperative nausea and vomiting)          Past Surgical History:   Procedure Laterality Date     dental surgeries       ear surgeries       EXAM UNDER ANESTHESIA, RESTORATIONS, EXTRACTION(S) DENTAL, COMBINED  7/6/2011    Procedure:COMBINED EXAM UNDER ANESTHESIA, RESTORATIONS, EXTRACTION(S) DENTAL; Biopsy and no extractions or alveoloplasty done; Surgeon:SOFIYA SANTIAGO; [facility]     EXAM UNDER ANESTHESIA, RESTORATIONS, EXTRACTION(S) DENTAL, COMBINED  10/23/2013    Procedure: COMBINED EXAM UNDER ANESTHESIA, RESTORATIONS, EXTRACTION(S) DENTAL;  Dental Exam, Under Anesthesia, Radiographs, Dental Restorations, Periodontal Therapy, Dental ExtractionsX1, Gingivectomy, Fluoride Varnish;  Surgeon: Sofiya Santiago DDS;  [facility]     EXAM UNDER ANESTHESIA, RESTORATIONS, EXTRACTION(S) DENTAL, COMBINED N/A 8/1/2016    Procedure: COMBINED EXAM UNDER ANESTHESIA, RESTORATIONS, EXTRACTION(S) DENTAL;  Surgeon: Debra David DDS;  [facility]     EXAM UNDER ANESTHESIA, RESTORATIONS, EXTRACTION(S) DENTAL, COMBINED N/A 8/14/2017    Procedure: COMBINED EXAM UNDER ANESTHESIA, RESTORATIONS, EXTRACTION(S) DENTAL;  Dental Exam, Restorations x4, Radiographs, Periodontal Therapy, fluoride treatment;  Surgeon: Wilfred Campos  FOSTER Dan;  [facility]     EXAM UNDER ANESTHESIA, RESTORATIONS, EXTRACTION(S) DENTAL, COMBINED N/A 5/9/2019    Procedure: Right/Left Dental Exam, Radiographs, Eight Restorations, Periodontal Therapy, and Fluoride Treatment;  Surgeon: Dunia Castro DDS;  [facility]     EYE SURGERY  12/05/2001    JON frontal sling Dr. Rivera     EYE SURGERY  01/17/2001    BMR, RSR      EYE SURGERY  6/95    [facility] Dr. Ybarra     PE TUBES       STRABISMUS SURGERY           Social History     Tobacco Use     Smoking status: Passive Smoke Exposure - Never Smoker     Smokeless tobacco: Never     Tobacco comments:     occasional exposure to second hand smoke   Substance Use Topics     Alcohol use: No         Family History   Problem Relation Age of Onset     Macular Degeneration Mother      Diabetes Maternal Grandfather      Glaucoma Maternal Grandfather      Glaucoma Maternal Grandmother      Macular Degeneration Maternal Grandmother      Macular Degeneration Other          Immunizations and Allergies     Immunization History   Administered Date(s) Administered     COVID-19 Vaccine 18+ (Moderna) 03/16/2021, 04/13/2021, 11/12/2021         No Known Allergies      Medication     Prior to Admission Medications       No current Epic-ordered facility-administered medications on file.     Current Outpatient Medications Ordered in Epic   Medication     acetaminophen (TYLENOL) 325 MG tablet     ibuprofen (ADVIL/MOTRIN) 600 MG tablet     Sodium Fluoride 1.1 % PSTE         Exam and Assessment    Ge Marquezman 1990 presented to the OR at [facility] due to Pre-procedure diagnosis: Chronic gingivitis; plaque induced, K05.10, Dental caries on pit and fissure surface penetrating into pulp. K02.53, Dental Caries on smooth surface, K02.6 and Necrosis of the pulp, K04.1.     Extra-Oral:  No submandibular lymphadenopathy, no induration or erythema, no abnormal skin lesions, inferior border of mandible is palpable  bilaterally, MARIANO >45mm. No physical impediment from TMJ bilaterally. No clicking of TMJ on opening and lateral movements.    Intraoral exam: Reveals heavy plaque, moderate calculus, moderate bleeding on probing, decayed/fractured and poor dentition. Mallampati Class II (visualization of the soft palate, fauces, and uvula. Generalised mild recession, reddish pink gingiva with heavy plaque and moderate calculus. No soft tissue mass or lesion identified.    Probing depth range (mm):  Upper right: 2-3  Upper left: 2-4  Lower left: 2-4  Lower right: 2-4    Radiographic exam: Radiographs revealed Periradicular associated with teeth #12-upper left 1st premolar, Coronal radiolucencies consistent with decay on teeth #11--upper left canine , #12-upper left 1st premolar, #13--upper left 2nd premolar and #30-lower right 1st molar, generalised RBL noted       The post-operative diagnosis was .  Parent informed of the clinical and radiographic findings.        Larissa Cervantes DDS PGY1  Attending: Dr. Wilfred Campos

## 2023-04-02 ENCOUNTER — HEALTH MAINTENANCE LETTER (OUTPATIENT)
Age: 33
End: 2023-04-02

## 2024-04-18 NOTE — TELEPHONE ENCOUNTER
FUTURE VISIT INFORMATION      SURGERY INFORMATION:  Date: 5/23/24  [facility]  Surgeon:  Xenia Stephen DDS   Anesthesia Type:  general  Procedure: Bilateral Dental Exam, Radiograph, Restorations, Extractions, Root Canal Therapy, Frenectomy, Gingivectomy, Alveoloplasty, Biopsy, Fluroride Varnish, Periodontal Therapy Treatment   RECORDS REQUESTED FROM:       Primary Care Provider: Missy Thompson MD - [facility]

## 2024-05-08 ENCOUNTER — PRE VISIT (OUTPATIENT)
Dept: SURGERY | Facility: CLINIC | Age: 34
End: 2024-05-08

## 2024-05-08 ENCOUNTER — OFFICE VISIT (OUTPATIENT)
Dept: SURGERY | Facility: CLINIC | Age: 34
End: 2024-05-08
Payer: MEDICAID

## 2024-05-08 ENCOUNTER — ANESTHESIA EVENT (OUTPATIENT)
Dept: SURGERY | Facility: CLINIC | Age: 34
End: 2024-05-08
Payer: MEDICAID

## 2024-05-08 VITALS
BODY MASS INDEX: 36.06 KG/M2 | TEMPERATURE: 98.6 F | SYSTOLIC BLOOD PRESSURE: 138 MMHG | HEART RATE: 119 BPM | OXYGEN SATURATION: 95 % | WEIGHT: 281 LBS | DIASTOLIC BLOOD PRESSURE: 90 MMHG | HEIGHT: 74 IN

## 2024-05-08 DIAGNOSIS — Z01.818 PREOP EXAMINATION: Primary | ICD-10-CM

## 2024-05-08 DIAGNOSIS — K02.9 DENTAL CARIES: ICD-10-CM

## 2024-05-08 PROCEDURE — 99203 OFFICE O/P NEW LOW 30 MIN: CPT | Performed by: NURSE PRACTITIONER

## 2024-05-08 ASSESSMENT — PAIN SCALES - GENERAL: PAINLEVEL: NO PAIN (0)

## 2024-05-08 NOTE — PATIENT INSTRUCTIONS
Preparing for Your Surgery      Name:  Ge Gregory   MRN:  [id number]   :  1990   Today's Date:  2024       Arriving for surgery:  Surgery date:  24  Arrival time:  05:30 am        Please come to:      [facility] [address]   [address] [place]  [address]   The Green Ramp for patients and visitors is beneath the [facility]. The parking facility entrance is at the intersection of [address] [place] [address] and [address]. Patients and visitors who self-park will receive the reduced hospital parking rate (no ticket validation needed).     [employer] parking, located at the [facility] main entrance on [address] [place] [address], is available Monday - Friday from 7 am to 3:30 pm.     Discounted parking pass options can be purchased from  attendants during business hours.     -Check in at the security desk in the [facility] ([facility])   Lobby. They will direct you to the correct elevators.   -Proceed to the 3rd floor, Adult Surgery Waiting Lounge. [phone]     If you need directions, a wheelchair or escort please stop at the Information Desk in the lobby.  Inform the information person you are here for surgery; a wheelchair and escort to [address] will be provided.   An escort to the Adult Surgery Waiting Lounge will be provided. .    What can I eat or drink?  -  You may eat and drink normally up to 8 hours prior to arrival time.   -  You may have clear liquids until 2 hours prior to arrival time.     Examples of clear liquids:  Water  Clear broth  Juices (apple, white grape, white cranberry  and cider) without pulp  Noncarbonated, powder based beverages  (lemonade and Alvin-Aid)  Sodas (Sprite, 7-Up, ginger ale and seltzer)  Coffee or tea (without milk or cream)  Gatorade    -  No Alcohol or cannabis products for at least 24 hours before surgery.     Which medicines  can I take?    Hold Aspirin for 7 days before surgery.   Hold Multivitamins for 7 days before surgery.  Hold Supplements for 7 days before surgery.  Hold Ibuprofen (Advil, Motrin) for 1 day(s) before surgery--unless otherwise directed by surgeon.  Hold Naproxen (Aleve) for 4 days before surgery.    -  PLEASE TAKE these medications the day of surgery:  Tylenol if needed.    How do I prepare myself?  - Please take 2 showers (one the night prior to surgery and one the morning of surgery) using Scrubcare or Hibiclens soap.    Use this soap only from the neck to your toes.     Leave the soap on your skin for one minute--then rinse thoroughly.      You may use your own shampoo and conditioner. No other hair products.   - Please remove all jewelry and body piercings.  - No lotions, deodorants or fragrance.  - No makeup or fingernail polish.   - Bring your ID and insurance card.    -If you use a CPAP machine, please bring the CPAP machine, tubing, and mask to hospital.    -If you have a Deep Brain Stimulator, Spinal Cord Stimulator, or any Neuro Stimulator device---you must bring the remote control to the hospital.      ALL PATIENTS GOING HOME THE SAME DAY OF SURGERY ARE REQUIRED TO HAVE A RESPONSIBLE ADULT TO DRIVE AND BE IN ATTENDANCE WITH THEM FOR 24 HOURS FOLLOWING SURGERY.    Covid testing policy as of 12/06/2022  Your surgeon will notify and schedule you for a COVID test if one is needed before surgery--please direct any questions or COVID symptoms to your surgeon      Questions or Concerns:    - For any questions regarding the day of surgery or your hospital stay, please contact the Pre Admission Nursing Office at [phone].       - If you have health changes between today and your surgery, please call your surgeon.       - For questions after surgery, please call your surgeons office.           Current Visitor Guidelines    You may have 2 visitors in the pre op area.    Visiting hours: 8 a.m. to 8:30  p.m.    Patients confirmed or suspected to have symptoms of COVID 19 or flu:     No visitors allowed for adult patients.   Children (under age 18) can have 1 named visitor.     People who are sick or showing symptoms of COVID 19 or flu:    Are not allowed to visit patients--we can only make exceptions in special situations.       Please follow these guidelines for your visit:          Please maintain social distance          Masking is optional--however at times you may be asked to wear a mask for the safety of yourself and others     Clean your hands with alcohol hand . Do this when you arrive at and leave the building and patient room,    And again after you touch your mask or anything in the room.     Go directly to and from the room you are visiting.     Stay in the patient s room during your visit. Limit going to other places in the hospital as much as possible     Leave bags and jackets at home or in the car.     For everyone s health, please don t come and go during your visit. That includes for smoking   during your visit.

## 2024-05-08 NOTE — H&P
Pre-Operative H & P     CC:  Preoperative exam to assess for increased cardiopulmonary risk while undergoing surgery and anesthesia.    Date of Encounter: 5/8/2024  Primary Care Physician:  Missy Thompson     Reason for visit:   Encounter Diagnoses   Name Primary?    Preop examination Yes    Dental caries        HPI  Ge Gregory is a 34 year old male who presents for pre-operative H & P in preparation for  Procedure Information       Case: [id number] Date/Time: 05/23/24 0730    Procedure: Bilateral dental exam, radiograph, dental restorations, dental extractions, pulpotomy, root canal therapy, biopsy, frenectomy, gingivectomy, alveoloplasty, periodontal therapy, fluoride varnish in the mouth (Mouth)    Anesthesia type: General    Diagnosis: Dental caries [K02.9]    Pre-op diagnosis: Dental caries [K02.9]    [facility]    Providers: eXnia Stephen DDS            Ge Gregory is a 34 year old male with FAS, developmental delay, autism and obesity that has dental caries.  Due to his developmental delay he requires yearly dental procedure with anesthesia for general dental cleaning and other necessary procedures.  His last dental procedure with anesthesia was on 2/16/23.  The above listed procedure has been planned.    History is obtained from the patient, his mother and chart review    Hx of abnormal bleeding or anti-platelet use: none      Past Medical History  Past Medical History:   Diagnosis Date    Autism     Autism     Autistic spectrum disorder     Congenital ptosis     Deaf     left ear only    Dental caries     Development delay     Development delay     Global    Developmental delay     Developmental delay     Exotropia     left     Fetal alcohol effects     fetal alcohol exposure    Fetal alcohol syndrome     Hearing loss     Insomnia     PONV (postoperative nausea and vomiting)        Past Surgical History  Past Surgical History:   Procedure Laterality Date    dental surgeries      ear  surgeries      EXAM UNDER ANESTHESIA, RESTORATIONS, EXTRACTION(S) DENTAL COMPLEX, COMBINED Bilateral 2/16/2023    Procedure: Bilateral dental exam, Dental radiographs, dental restorations X 5, dental extractions X 1, periodontal therapy, fluoride, varnish in the mouth,;  Surgeon: Wilfred Campos DDS;  [facility]    EXAM UNDER ANESTHESIA, RESTORATIONS, EXTRACTION(S) DENTAL, COMBINED  7/6/2011    Procedure:COMBINED EXAM UNDER ANESTHESIA, RESTORATIONS, EXTRACTION(S) DENTAL; Biopsy and no extractions or alveoloplasty done; Surgeon:SOFIYA GALLEGOS; [facility]    EXAM UNDER ANESTHESIA, RESTORATIONS, EXTRACTION(S) DENTAL, COMBINED  10/23/2013    Procedure: COMBINED EXAM UNDER ANESTHESIA, RESTORATIONS, EXTRACTION(S) DENTAL;  Dental Exam, Under Anesthesia, Radiographs, Dental Restorations, Periodontal Therapy, Dental ExtractionsX1, Gingivectomy, Fluoride Varnish;  Surgeon: Sofiya Gallegos DDS;  [facility]    EXAM UNDER ANESTHESIA, RESTORATIONS, EXTRACTION(S) DENTAL, COMBINED N/A 8/1/2016    Procedure: COMBINED EXAM UNDER ANESTHESIA, RESTORATIONS, EXTRACTION(S) DENTAL;  Surgeon: Debra David DDS;  [facility]    EXAM UNDER ANESTHESIA, RESTORATIONS, EXTRACTION(S) DENTAL, COMBINED N/A 8/14/2017    Procedure: COMBINED EXAM UNDER ANESTHESIA, RESTORATIONS, EXTRACTION(S) DENTAL;  Dental Exam, Restorations x4, Radiographs, Periodontal Therapy, fluoride treatment;  Surgeon: Wilfred Campos DDS;  [facility]    EXAM UNDER ANESTHESIA, RESTORATIONS, EXTRACTION(S) DENTAL, COMBINED N/A 5/9/2019    Procedure: Right/Left Dental Exam, Radiographs, Eight Restorations, Periodontal Therapy, and Fluoride Treatment;  Surgeon: Dnuia Castro DDS;  [facility]    EYE SURGERY  12/05/2001    JON frontal sling Dr. Rivera    EYE SURGERY  01/17/2001    BMR, [facility]     EYE SURGERY  6/95    BIOC, [facility] Dr. Ybarra    PE TUBES      STRABISMUS SURGERY         Prior to Admission  Medications  Current Outpatient Medications   Medication Sig Dispense Refill    acetaminophen (TYLENOL) 325 MG tablet Take 2 tablets (650 mg) by mouth every 4 hours as needed for mild pain 25 tablet 0    ibuprofen (ADVIL/MOTRIN) 600 MG tablet Take 1 tablet (600 mg) by mouth every 6 hours as needed for moderate pain (4-6) 20 tablet 0    Sodium Fluoride 1.1 % PSTE Brush with a pea size amount twice a day for 2 minutes.  Spit out excess, do not rinse.  Do not eat or drink anything for 30 minutes afterwards. 1 Tube 11       Allergies  No Known Allergies    Social History  Social History     Socioeconomic History    Marital status: Single     Spouse name: Not on file    Number of children: Not on file    Years of education: Not on file    Highest education level: Not on file   Occupational History    Not on file   Tobacco Use    Smoking status: Passive Smoke Exposure - Never Smoker    Smokeless tobacco: Never    Tobacco comments:     occasional exposure to second hand smoke   Substance and Sexual Activity    Alcohol use: No    Drug use: No    Sexual activity: Not on file   Other Topics Concern    Parent/sibling w/ CABG, MI or angioplasty before 65F 55M? Not Asked   Social History Narrative    Not on file     Social Determinants of Health     Financial Resource Strain: Not At Risk (4/25/2024)    Received from [facility]    Financial Resource Strain     Is it hard for you to pay for the very basics like food, housing, medical care or heating?: No   Food Insecurity: Not At Risk (4/25/2024)    Received from [facility]    Food Insecurity     Does your food run out before you have the money to buy more?: No   Transportation Needs: Not At Risk (4/25/2024)    Received from [facility]    Transportation Needs     Does a lack of transportation keep you from your medical appointments or from getting your medications?: No   Physical Activity: Not on file   Stress: Not on file   Social Connections: Not on file  "  Interpersonal Safety: Not on file   Housing Stability: Not on file       Family History  Family History   Problem Relation Age of Onset    No Known Problems Mother     Hypertension Father     Glaucoma Maternal Grandmother     Macular Degeneration Maternal Grandmother     Diabetes Maternal Grandfather     Glaucoma Maternal Grandfather     Macular Degeneration Other     Anesthesia Reaction No family hx of     Thrombosis No family hx of        Review of Systems  The complete review of systems is negative other than noted in the HPI or here.   Anesthesia Evaluation   Pt has had prior anesthetic.     History of anesthetic complications  - PONV.      ROS/MED HX  ENT/Pulmonary:  - neg pulmonary ROS     Neurologic: Comment: FAS  autism    (+)                         Developmental delay,       Cardiovascular:     (+)  - -   -  - -                                 No previous cardiac testing     METS/Exercise Tolerance: >4 METS Comment: Walks regularly for exercises.  Can walk several blocks at a time without exertional dyspnea or angina.   Hematologic:  - neg hematologic  ROS     Musculoskeletal:  - neg musculoskeletal ROS     GI/Hepatic:  - neg GI/hepatic ROS     Renal/Genitourinary:  - neg Renal ROS     Endo:     (+)               Obesity,       Psychiatric/Substance Use:  - neg psychiatric ROS  (-) psychiatric history   Infectious Disease:  - neg infectious disease ROS     Malignancy:  - neg malignancy ROS     Other:  - neg other ROS          BP (!) 138/90   Pulse 119   Temp 98.6  F (37  C) (Oral)   Ht 1.88 m (6' 2\")   Wt 127.5 kg (281 lb)   SpO2 95%   BMI 36.08 kg/m      Physical Exam   Constitutional: Awake, alert, cooperative, no apparent distress, and appears older than stated age. obese  Eyes: Pupils equal, round and reactive to light, extra ocular muscles intact, sclera clear, conjunctiva normal.  HENT: Normocephalic, oral pharynx with moist mucus membranes, good dentition. No goiter appreciated. "   Respiratory: Clear to auscultation bilaterally, no crackles or wheezing.  Cardiovascular: Regular rhythm, tachycardic, normal S1 and S2, and no murmur noted.  Carotids +2, no bruits. No edema. Palpable pulses to radial  DP and PT arteries.   GI: Normal bowel sounds, soft, non-distended, non-tender, no masses palpated, no hepatosplenomegaly.    Lymph/Hematologic: No cervical lymphadenopathy and no supraclavicular lymphadenopathy.  Genitourinary:  deferred  Skin: Warm and dry.  No rashes at anticipated surgical site.   Musculoskeletal: Full ROM of neck. There is no redness, warmth, or swelling of the exposed joints. Gross motor strength is normal.    Neurologic: Awake, alert, oriented to name, place and time. Cranial nerves II-XII are grossly intact. Gait is normal.   Neuropsychiatric: Calm, cooperative. nervous affect. Pacing during visit     Prior Labs/Diagnostic Studies   All labs and imaging personally reviewed     EKG/ stress test - if available please see in ROS above       The patient's records and results personally reviewed by this provider.     Outside records reviewed from: Care Everywhere    LAB/DIAGNOSTIC STUDIES TODAY:  none  - patient's mother requests labs for DOS    Assessment    Ge Gregory is a 34 year old male seen as a PAC referral for risk assessment and optimization for anesthesia.    Plan/Recommendations  Pt will be optimized for the proposed procedure.  See below for details on the assessment, risk, and preoperative recommendations    NEUROLOGY  - No history of TIA, CVA or seizure  - FAS, developmental delay and autism.  Patient is verbal and follows instructions well.    -Post Op delirium risk factors:  History of pre-existing cognitive dysfunction    ENT  - no prior history of difficult intubation  Mallampati: IV  TM: > 3    CARDIAC  - No history of CAD, Hypertension, and Afib  - tachycardic and hypertensive during visit today, but patient is very nervous for the visit and was pacing  "throughout the visit.     - METS (Metabolic Equivalents)  Patient performs 4 or more METS exercise without symptoms             Total Score: 0      RCRI-Very low risk: Class 1 0.4% complication rate             Total Score: 0        PULMONARY    [place] Low Risk             Total Score: 2    [place]: BMI over 35 kg/m2    [place]: Male      - Denies asthma or inhaler use  - Tobacco History    History   Smoking Status    Passive Smoke Exposure - Never Smoker   Smokeless Tobacco    Never     Comment: occasional exposure to second hand smoke       GI  - denies GERD  PONV Medium Risk  Total Score: 2           1 AN PONV: Patient is not a current smoker    1 AN PONV: Patient has history of PONV          ENDOCRINE    - BMI: Estimated body mass index is 36.08 kg/m  as calculated from the following:    Height as of this encounter: 1.88 m (6' 2\").    Weight as of this encounter: 127.5 kg (281 lb).  Obesity (BMI >30)  - No history of Diabetes Mellitus    HEME  VTE Low Risk 0.5%             Total Score: 2    VTE: Male      - No history of abnormal bleeding or antiplatelet use.      PSYCH  - anxious during visit today situationally, but mother notes that he doesn't typically have anxiety.       Different anesthesia methods/types have been discussed with the patient, but they are aware that the final plan will be decided by the assigned anesthesia provider on the date of service.      The patient is optimized for their procedure. AVS with information on surgery time/arrival time, meds and NPO status given by nursing staff. No further diagnostic testing indicated.      On the day of service:     Prep time: 5 minutes  Visit time: 13 minutes  Documentation time: 9 minutes  ------------------------------------------  Total time: 27 minutes      ASHLEY Hernandes CNP  Preoperative Assessment Center  [facility]  Phone: [phone]  Fax: [phone]    "

## 2024-05-23 ENCOUNTER — HOSPITAL ENCOUNTER (OUTPATIENT)
Facility: CLINIC | Age: 34
Discharge: HOME | End: 2024-05-23
Attending: DENTIST | Admitting: DENTIST
Payer: MEDICAID

## 2024-05-23 ENCOUNTER — ANESTHESIA (OUTPATIENT)
Dept: SURGERY | Facility: CLINIC | Age: 34
End: 2024-05-23
Payer: MEDICAID

## 2024-05-23 VITALS
BODY MASS INDEX: 35.75 KG/M2 | SYSTOLIC BLOOD PRESSURE: 121 MMHG | OXYGEN SATURATION: 94 % | WEIGHT: 278.44 LBS | HEART RATE: 102 BPM | TEMPERATURE: 98.6 F | DIASTOLIC BLOOD PRESSURE: 67 MMHG | RESPIRATION RATE: 20 BRPM

## 2024-05-23 LAB
ANION GAP SERPL CALCULATED.3IONS-SCNC: 12 MMOL/L (ref 7–15)
BUN SERPL-MCNC: 15.8 MG/DL (ref 6–20)
CALCIUM SERPL-MCNC: 9.2 MG/DL (ref 8.6–10)
CHLORIDE SERPL-SCNC: 101 MMOL/L (ref 98–107)
CREAT SERPL-MCNC: 1 MG/DL (ref 0.67–1.17)
DEPRECATED HCO3 PLAS-SCNC: 24 MMOL/L (ref 22–29)
EGFRCR SERPLBLD CKD-EPI 2021: >90 ML/MIN/1.73M2
ERYTHROCYTE [DISTWIDTH] IN BLOOD BY AUTOMATED COUNT: 12.6 % (ref 10–15)
GLUCOSE SERPL-MCNC: 146 MG/DL (ref 70–99)
HCT VFR BLD AUTO: 45.8 % (ref 40–53)
HGB BLD-MCNC: 15.6 G/DL (ref 13.3–17.7)
MCH RBC QN AUTO: 30.2 PG (ref 26.5–33)
MCHC RBC AUTO-ENTMCNC: 34.1 G/DL (ref 31.5–36.5)
MCV RBC AUTO: 89 FL (ref 78–100)
PLATELET # BLD AUTO: 203 10E3/UL (ref 150–450)
POTASSIUM SERPL-SCNC: 4 MMOL/L (ref 3.4–5.3)
RBC # BLD AUTO: 5.16 10E6/UL (ref 4.4–5.9)
SODIUM SERPL-SCNC: 137 MMOL/L (ref 135–145)
WBC # BLD AUTO: 8.1 10E3/UL (ref 4–11)

## 2024-05-23 PROCEDURE — 41899 UNLISTED PX DENTALVLR STRUX: CPT

## 2024-05-23 PROCEDURE — 41899 UNLISTED PX DENTALVLR STRUX: CPT | Performed by: ANESTHESIOLOGY

## 2024-05-23 PROCEDURE — 370N000017 HC ANESTHESIA TECHNICAL FEE, PER MIN: Performed by: DENTIST

## 2024-05-23 PROCEDURE — 710N000010 HC RECOVERY PHASE 1, LEVEL 2, PER MIN: Performed by: DENTIST

## 2024-05-23 PROCEDURE — 85027 COMPLETE CBC AUTOMATED: CPT | Performed by: NURSE PRACTITIONER

## 2024-05-23 PROCEDURE — 360N000075 HC SURGERY LEVEL 2, PER MIN: Performed by: DENTIST

## 2024-05-23 PROCEDURE — 710N000012 HC RECOVERY PHASE 2, PER MINUTE: Performed by: DENTIST

## 2024-05-23 PROCEDURE — 250N000025 HC SEVOFLURANE, PER MIN: Performed by: DENTIST

## 2024-05-23 PROCEDURE — 258N000003 HC RX IP 258 OP 636

## 2024-05-23 PROCEDURE — 250N000013 HC RX MED GY IP 250 OP 250 PS 637: Performed by: DENTIST

## 2024-05-23 PROCEDURE — 80048 BASIC METABOLIC PNL TOTAL CA: CPT | Performed by: NURSE PRACTITIONER

## 2024-05-23 PROCEDURE — 250N000009 HC RX 250: Performed by: DENTIST

## 2024-05-23 PROCEDURE — 999N000141 HC STATISTIC PRE-PROCEDURE NURSING ASSESSMENT: Performed by: DENTIST

## 2024-05-23 PROCEDURE — 250N000009 HC RX 250

## 2024-05-23 PROCEDURE — 250N000011 HC RX IP 250 OP 636

## 2024-05-23 RX ORDER — BACITRACIN ZINC 500 [USP'U]/G
OINTMENT TOPICAL PRN
Status: DISCONTINUED | OUTPATIENT
Start: 2024-05-23 | End: 2024-05-23 | Stop reason: HOSPADM

## 2024-05-23 RX ORDER — CHLORHEXIDINE GLUCONATE ORAL RINSE 1.2 MG/ML
SOLUTION DENTAL PRN
Status: DISCONTINUED | OUTPATIENT
Start: 2024-05-23 | End: 2024-05-23 | Stop reason: HOSPADM

## 2024-05-23 RX ORDER — FENTANYL CITRATE 50 UG/ML
INJECTION, SOLUTION INTRAMUSCULAR; INTRAVENOUS PRN
Status: DISCONTINUED | OUTPATIENT
Start: 2024-05-23 | End: 2024-05-23

## 2024-05-23 RX ORDER — FENTANYL CITRATE 50 UG/ML
25 INJECTION, SOLUTION INTRAMUSCULAR; INTRAVENOUS EVERY 5 MIN PRN
Status: DISCONTINUED | OUTPATIENT
Start: 2024-05-23 | End: 2024-05-23 | Stop reason: HOSPADM

## 2024-05-23 RX ORDER — ONDANSETRON 2 MG/ML
4 INJECTION INTRAMUSCULAR; INTRAVENOUS EVERY 30 MIN PRN
Status: DISCONTINUED | OUTPATIENT
Start: 2024-05-23 | End: 2024-05-23 | Stop reason: HOSPADM

## 2024-05-23 RX ORDER — SODIUM CHLORIDE, SODIUM LACTATE, POTASSIUM CHLORIDE, CALCIUM CHLORIDE 600; 310; 30; 20 MG/100ML; MG/100ML; MG/100ML; MG/100ML
INJECTION, SOLUTION INTRAVENOUS CONTINUOUS
Status: DISCONTINUED | OUTPATIENT
Start: 2024-05-23 | End: 2024-05-23 | Stop reason: HOSPADM

## 2024-05-23 RX ORDER — ONDANSETRON 2 MG/ML
INJECTION INTRAMUSCULAR; INTRAVENOUS PRN
Status: DISCONTINUED | OUTPATIENT
Start: 2024-05-23 | End: 2024-05-23

## 2024-05-23 RX ORDER — FENTANYL CITRATE 50 UG/ML
50 INJECTION, SOLUTION INTRAMUSCULAR; INTRAVENOUS EVERY 5 MIN PRN
Status: DISCONTINUED | OUTPATIENT
Start: 2024-05-23 | End: 2024-05-23 | Stop reason: HOSPADM

## 2024-05-23 RX ORDER — OXYMETAZOLINE HYDROCHLORIDE 0.05 G/100ML
SPRAY NASAL PRN
Status: DISCONTINUED | OUTPATIENT
Start: 2024-05-23 | End: 2024-05-23

## 2024-05-23 RX ORDER — ONDANSETRON 4 MG/1
4 TABLET, ORALLY DISINTEGRATING ORAL EVERY 30 MIN PRN
Status: DISCONTINUED | OUTPATIENT
Start: 2024-05-23 | End: 2024-05-23 | Stop reason: HOSPADM

## 2024-05-23 RX ORDER — PROPOFOL 10 MG/ML
INJECTION, EMULSION INTRAVENOUS PRN
Status: DISCONTINUED | OUTPATIENT
Start: 2024-05-23 | End: 2024-05-23

## 2024-05-23 RX ORDER — DEXAMETHASONE SODIUM PHOSPHATE 4 MG/ML
4 INJECTION, SOLUTION INTRA-ARTICULAR; INTRALESIONAL; INTRAMUSCULAR; INTRAVENOUS; SOFT TISSUE
Status: DISCONTINUED | OUTPATIENT
Start: 2024-05-23 | End: 2024-05-23 | Stop reason: HOSPADM

## 2024-05-23 RX ORDER — HYDROMORPHONE HYDROCHLORIDE 1 MG/ML
0.2 INJECTION, SOLUTION INTRAMUSCULAR; INTRAVENOUS; SUBCUTANEOUS EVERY 5 MIN PRN
Status: DISCONTINUED | OUTPATIENT
Start: 2024-05-23 | End: 2024-05-23 | Stop reason: HOSPADM

## 2024-05-23 RX ORDER — NALOXONE HYDROCHLORIDE 0.4 MG/ML
0.1 INJECTION, SOLUTION INTRAMUSCULAR; INTRAVENOUS; SUBCUTANEOUS
Status: DISCONTINUED | OUTPATIENT
Start: 2024-05-23 | End: 2024-05-23 | Stop reason: HOSPADM

## 2024-05-23 RX ORDER — EPHEDRINE SULFATE 50 MG/ML
INJECTION, SOLUTION INTRAMUSCULAR; INTRAVENOUS; SUBCUTANEOUS PRN
Status: DISCONTINUED | OUTPATIENT
Start: 2024-05-23 | End: 2024-05-23

## 2024-05-23 RX ORDER — LIDOCAINE HYDROCHLORIDE 20 MG/ML
INJECTION, SOLUTION INFILTRATION; PERINEURAL PRN
Status: DISCONTINUED | OUTPATIENT
Start: 2024-05-23 | End: 2024-05-23

## 2024-05-23 RX ORDER — DEXAMETHASONE SODIUM PHOSPHATE 4 MG/ML
INJECTION, SOLUTION INTRA-ARTICULAR; INTRALESIONAL; INTRAMUSCULAR; INTRAVENOUS; SOFT TISSUE PRN
Status: DISCONTINUED | OUTPATIENT
Start: 2024-05-23 | End: 2024-05-23

## 2024-05-23 RX ORDER — HYDROMORPHONE HYDROCHLORIDE 1 MG/ML
0.4 INJECTION, SOLUTION INTRAMUSCULAR; INTRAVENOUS; SUBCUTANEOUS EVERY 5 MIN PRN
Status: DISCONTINUED | OUTPATIENT
Start: 2024-05-23 | End: 2024-05-23 | Stop reason: HOSPADM

## 2024-05-23 RX ORDER — SODIUM CHLORIDE, SODIUM LACTATE, POTASSIUM CHLORIDE, CALCIUM CHLORIDE 600; 310; 30; 20 MG/100ML; MG/100ML; MG/100ML; MG/100ML
INJECTION, SOLUTION INTRAVENOUS CONTINUOUS PRN
Status: DISCONTINUED | OUTPATIENT
Start: 2024-05-23 | End: 2024-05-23

## 2024-05-23 RX ORDER — KETOROLAC TROMETHAMINE 30 MG/ML
INJECTION, SOLUTION INTRAMUSCULAR; INTRAVENOUS PRN
Status: DISCONTINUED | OUTPATIENT
Start: 2024-05-23 | End: 2024-05-23

## 2024-05-23 RX ADMIN — PROPOFOL 200 MG: 10 INJECTION, EMULSION INTRAVENOUS at 07:42

## 2024-05-23 RX ADMIN — FENTANYL CITRATE 50 MCG: 50 INJECTION INTRAMUSCULAR; INTRAVENOUS at 07:41

## 2024-05-23 RX ADMIN — PHENYLEPHRINE HYDROCHLORIDE 100 MCG: 10 INJECTION INTRAVENOUS at 08:42

## 2024-05-23 RX ADMIN — SODIUM CHLORIDE, POTASSIUM CHLORIDE, SODIUM LACTATE AND CALCIUM CHLORIDE: 600; 310; 30; 20 INJECTION, SOLUTION INTRAVENOUS at 09:56

## 2024-05-23 RX ADMIN — Medication 50 MG: at 07:43

## 2024-05-23 RX ADMIN — PROPOFOL 50 MG: 10 INJECTION, EMULSION INTRAVENOUS at 08:31

## 2024-05-23 RX ADMIN — OXYMETAZOLINE HYDROCHLORIDE 2 SPRAY: 0.05 SPRAY NASAL at 07:44

## 2024-05-23 RX ADMIN — MIDAZOLAM 2 MG: 1 INJECTION INTRAMUSCULAR; INTRAVENOUS at 07:39

## 2024-05-23 RX ADMIN — LIDOCAINE HYDROCHLORIDE 100 MG: 20 INJECTION, SOLUTION INFILTRATION; PERINEURAL at 07:41

## 2024-05-23 RX ADMIN — PHENYLEPHRINE HYDROCHLORIDE 100 MCG: 10 INJECTION INTRAVENOUS at 08:38

## 2024-05-23 RX ADMIN — PHENYLEPHRINE HYDROCHLORIDE 0.2 MCG/KG/MIN: 10 INJECTION INTRAVENOUS at 08:57

## 2024-05-23 RX ADMIN — EPHEDRINE SULFATE 5 MG: 5 INJECTION INTRAVENOUS at 08:55

## 2024-05-23 RX ADMIN — ONDANSETRON 4 MG: 2 INJECTION INTRAMUSCULAR; INTRAVENOUS at 09:54

## 2024-05-23 RX ADMIN — PHENYLEPHRINE HYDROCHLORIDE 100 MCG: 10 INJECTION INTRAVENOUS at 08:47

## 2024-05-23 RX ADMIN — DEXAMETHASONE SODIUM PHOSPHATE 8 MG: 4 INJECTION, SOLUTION INTRA-ARTICULAR; INTRALESIONAL; INTRAMUSCULAR; INTRAVENOUS; SOFT TISSUE at 08:09

## 2024-05-23 RX ADMIN — SUGAMMADEX 200 MG: 100 INJECTION, SOLUTION INTRAVENOUS at 10:00

## 2024-05-23 RX ADMIN — EPHEDRINE SULFATE 5 MG: 5 INJECTION INTRAVENOUS at 08:52

## 2024-05-23 RX ADMIN — SODIUM CHLORIDE, POTASSIUM CHLORIDE, SODIUM LACTATE AND CALCIUM CHLORIDE: 600; 310; 30; 20 INJECTION, SOLUTION INTRAVENOUS at 07:38

## 2024-05-23 RX ADMIN — KETOROLAC TROMETHAMINE 30 MG: 30 INJECTION, SOLUTION INTRAMUSCULAR at 09:54

## 2024-05-23 ASSESSMENT — ACTIVITIES OF DAILY LIVING (ADL)
ADLS_ACUITY_SCORE: 31
ADLS_ACUITY_SCORE: 29
ADLS_ACUITY_SCORE: 31
ADLS_ACUITY_SCORE: 31

## 2024-05-23 NOTE — BRIEF OP NOTE
[facility]    Brief Operative Note    Pre-operative diagnosis: Dental caries [K02.9]  Post-operative diagnosis Same as pre-operative diagnosis    Procedure: Bilateral dental exam, radiographs, three dental restorations, periodontal therapy, fluoride varnish in the mouth, N/A - Mouth    Surgeon: Surgeons and Role:     * Xenia Stephen DDS - Primary     * Serafin Montero MD - Resident - Assisting  Anesthesia: General   Estimated Blood Loss: Minimal    Drains: None  Specimens: * No specimens in log *  Findings:   None.  Complications: None.  Implants: * No implants in log *

## 2024-05-23 NOTE — ANESTHESIA CARE TRANSFER NOTE
Patient: Ge Gregory    Procedure: Procedure(s):  Bilateral dental exam, radiographs, three dental restorations, periodontal therapy, fluoride varnish in the mouth       Diagnosis: Dental caries [K02.9]  Diagnosis Additional Information: No value filed.    Anesthesia Type:   General     Note:    Oropharynx: oropharynx clear of all foreign objects  Level of Consciousness: awake  Oxygen Supplementation: face mask  Level of Supplemental Oxygen (L/min / FiO2): 10  Independent Airway: airway patency satisfactory and stable  Dentition: S/P dental procedure  Vital Signs Stable: post-procedure vital signs reviewed and stable  Report to RN Given: handoff report given  Patient transferred to: PACU    Handoff Report: Identifed the Patient, Identified the Reponsible Provider, Reviewed the pertinent medical history, Discussed the surgical course, Reviewed Intra-OP anesthesia mangement and issues during anesthesia, Set expectations for post-procedure period and Allowed opportunity for questions and acknowledgement of understanding      Vitals:  Vitals Value Taken Time   /69 05/23/24 1015   Temp 37.4  C (99.3  F) 05/23/24 1015   Pulse 113 05/23/24 1015   Resp 22 05/23/24 1015   SpO2 94 % 05/23/24 1026   Vitals shown include unfiled device data.    Electronically Signed By: ASHLEY Benson CRNA  May 23, 2024  10:27 AM

## 2024-05-23 NOTE — OP NOTE
[facility]   OR Dental Procedure Note    Patient:   Ge Gregoyr    Date of birth 1990   MRN:  [id number]   Date of Visit:  05/23/2024   Date of Admission 5/23/2024     Treatment Completed   General Anesthesia Start:  Ge Gregory is a 34 year old male brought into the operating room and draped in the usual customary [facility] fashion. Following the time-out procedures, the patient was placed under general anesthesia care via Right naris.    Under GA, the following treatments were performed:   Bilateral dental examination,   FMX radiographs were taken and reviewed.  Moist throat pack was placed, betadine applied circumferentially to oral cavity.   Pre-procedural rinse included: Chlorhexidine 0.12% solution followed by a 50/50 solution of sterile saline and hydrogen peroxide.    Periodontal Treatment: Full mouth prophylaxis: Bulk debridement completed with the Cavitron, followed use of hand scalers as necessary. Slow speed polish with medium grit polishing paste. All contacts flossed.    Composite Restoration(s):  Tooth #18-lower left 2nd molar, #20-lower left 2nd premolar, and #29-lower right 2nd premolar prepared with the electric hand pieces and hand instruments as necessary to remove decay and ensure retention of restoration, preparation cleansed with 38% Phosphoric etch, followed by two applications of 3M Scotchbond Universal adhesive, and restored with 3M Filtek composite shade A2, all materials used per  instructions. Restoration(s) polished with high and slow speed handpiece to minimize occlusal interference and sharp edges and contacts flossed as needed.     Removed overhand margin from #30,31 and 29.    Sodium Fluoride Varnish 5% placed on remaining teeth.     Throat pack placed at: 0841  Throat pack removed at: 0955  The oropharynx was inspected and found to be clear.   Estimated blood loss: minimal, <1  (mL)      Dental procedure Finish:  After the dental procedure, the patient was transferred to recovery room in good condition under the lead of the CRNA.The patient s family was informed by the dental team about the dental findings and procedures performed. Verbal and written post-op instructions given. All questions and concerns were invited and answered, patient and patient's family left pleased with treatment.       Clinic contact information:   [facility]  [facility] Needs [address] [address]  Phone:[phone]    Pre - Procedure   Patient name: Ge Gregory  : 1990  Medical record number:  [id number]  Dental procedures performed on: 2024  It was deemed necessary for this patient to be seen in the hospital operating room because pt is not able to be seen in clinic due to: Developmental Delays    Pre-procedure with patient & guardian:   Consent: Risks complications including but not limited to post-operative pain, swelling, bleeding, infection, temporary/permanent paresthesia/anesthesia of CN V3, lingual nerve, failure to resolve chief complaint. Patient 's guardian(s) agreed to procedure as written and signed consent after all questions were invited and answered.    Providers     Dental attending:Dr. Xenia Stephen DDS  Dental resident Serafin Montero DMD, Fellow  Dental resident/student:  Anesthesiologist:Isha Rivera MD   CRNA:Sp Snyder APRN CRNA   Anesthesiology resident:  Marija RN: Cesilia Thibodeaux RN   Circulator RN: Xena Bhatti RN       Patient review   Patient Health history: History is obtained from the patient's parent(s)  The 10 point Review of Systems is negative other than noted in the HPI and pertinent information mentioned above.     History of Present Illness:  Ge Gregory is a 34 year old male who presents to the OR for comprehensive dental treatment.      ASA 3 - Patient with moderate systemic disease with functional limitations    Physical Exam:  Vitals were reviewed  Temp: 99.3  F (37.4  C) Temp src: Axillary BP: 121/81 Pulse: 102   Resp: 22 SpO2: 95 % O2 Device: None (Room air) Oxygen Delivery: 6 LPM    Medical, Surgical, Social History       Past Medical History:   Diagnosis Date    Autism     Autism     Autistic spectrum disorder     Congenital ptosis     Deaf     left ear only    Dental caries     Development delay     Development delay     Global    Developmental delay     Developmental delay     Exotropia     left     Fetal alcohol effects     fetal alcohol exposure    Fetal alcohol syndrome     Hearing loss     Insomnia     PONV (postoperative nausea and vomiting)          Past Surgical History:   Procedure Laterality Date    dental surgeries      ear surgeries      EXAM UNDER ANESTHESIA, RESTORATIONS, EXTRACTION(S) DENTAL COMPLEX, COMBINED Bilateral 2/16/2023    Procedure: Bilateral dental exam, Dental radiographs, dental restorations X 5, dental extractions X 1, periodontal therapy, fluoride, varnish in the mouth,;  Surgeon: Wilfred Campos DDS;  [facility]    EXAM UNDER ANESTHESIA, RESTORATIONS, EXTRACTION(S) DENTAL, COMBINED  7/6/2011    Procedure:COMBINED EXAM UNDER ANESTHESIA, RESTORATIONS, EXTRACTION(S) DENTAL; Biopsy and no extractions or alveoloplasty done; Surgeon:SOFIYA GALLEGOS; [facility]    EXAM UNDER ANESTHESIA, RESTORATIONS, EXTRACTION(S) DENTAL, COMBINED  10/23/2013    Procedure: COMBINED EXAM UNDER ANESTHESIA, RESTORATIONS, EXTRACTION(S) DENTAL;  Dental Exam, Under Anesthesia, Radiographs, Dental Restorations, Periodontal Therapy, Dental ExtractionsX1, Gingivectomy, Fluoride Varnish;  Surgeon: Sofiya Gallegos DDS;  [facility]    EXAM UNDER ANESTHESIA, RESTORATIONS, EXTRACTION(S) DENTAL, COMBINED N/A 8/1/2016    Procedure: COMBINED EXAM UNDER ANESTHESIA, RESTORATIONS, EXTRACTION(S) DENTAL;  Surgeon:  Debra David DDS;  [facility]    EXAM UNDER ANESTHESIA, RESTORATIONS, EXTRACTION(S) DENTAL, COMBINED N/A 8/14/2017    Procedure: COMBINED EXAM UNDER ANESTHESIA, RESTORATIONS, EXTRACTION(S) DENTAL;  Dental Exam, Restorations x4, Radiographs, Periodontal Therapy, fluoride treatment;  Surgeon: Wilfred Campos DDS;  [facility]    EXAM UNDER ANESTHESIA, RESTORATIONS, EXTRACTION(S) DENTAL, COMBINED N/A 5/9/2019    Procedure: Right/Left Dental Exam, Radiographs, Eight Restorations, Periodontal Therapy, and Fluoride Treatment;  Surgeon: Dunia Castro DDS;  [facility]    EYE SURGERY  12/05/2001    JON frontal sling Dr. Rivera    EYE SURGERY  01/17/2001    BMR, RSR     EYE SURGERY  6/95    [facility] Dr. Ybarra    PE TUBES      STRABISMUS SURGERY           Social History     Tobacco Use    Smoking status: Passive Smoke Exposure - Never Smoker    Smokeless tobacco: Never    Tobacco comments:     occasional exposure to second hand smoke   Substance Use Topics    Alcohol use: No         Family History   Problem Relation Age of Onset    No Known Problems Mother     Hypertension Father     Glaucoma Maternal Grandmother     Macular Degeneration Maternal Grandmother     Diabetes Maternal Grandfather     Glaucoma Maternal Grandfather     Macular Degeneration Other     Anesthesia Reaction No family hx of     Thrombosis No family hx of          Immunizations and Allergies     Immunization History   Administered Date(s) Administered    COVID-19 Monovalent 18+ (Moderna) 03/16/2021, 04/13/2021, 11/12/2021         No Known Allergies      Medication     Prior to Admission Medications       Current Facility-Administered Medications   Medication Dose Route Frequency Provider Last Rate Last Admin    dexAMETHasone (DECADRON) injection 4 mg  4 mg Intravenous Once PRN Isha Rivera MD        fentaNYL (PF) (SUBLIMAZE) injection 25 mcg  25 mcg Intravenous Q5 Min PRN Isha Rivera MD        fentaNYL  (PF) (SUBLIMAZE) injection 50 mcg  50 mcg Intravenous Q5 Min PRN Isha Rivera MD        HYDROmorphone (PF) (DILAUDID) injection 0.2 mg  0.2 mg Intravenous Q5 Min PRN Isha Rivera MD        HYDROmorphone (PF) (DILAUDID) injection 0.4 mg  0.4 mg Intravenous Q5 Min PRN Isha Rivera MD        lactated ringers infusion   Intravenous Continuous Isha Rivera MD        naloxone (NARCAN) injection 0.1 mg  0.1 mg Intravenous Q2 Min PRN Isha Rivera MD        ondansetron (ZOFRAN ODT) ODT tab 4 mg  4 mg Oral Q30 Min PRN Isha Rivera MD        Or    ondansetron (ZOFRAN) injection 4 mg  4 mg Intravenous Q30 Min PRN Isha Rivera MD        prochlorperazine (COMPAZINE) injection 5 mg  5 mg Intravenous Q6H PRN Isha Rivera MD             Exam and Assessment    Ge Gregory is a 34 year old male that presented to the OR at [facility] due to Pre-procedure diagnosis: Chronic gingivitis; plaque induced, K05.10 and Dental caries, unspecified, K02.9     Extra-Oral:  No submandibular lymphadenopathy, no induration or erythema, no abnormal skin lesions, inferior border of mandible is palpable bilaterally, MARIANO >45mm. No physical impediment from TMJ bilaterally. No clicking of TMJ on opening and lateral movements.    Intraoral exam: Reveals heavy plaque, moderate calculus, moderate bleeding on probing, clinical decay seen on #18-lower left 2nd molar, #20-lower left 2nd premolar, and #29-lower right 2nd premolar, decayed/fractured/poor dentition. Mallampati Class III (visualization of the soft palate and base of uvula). Generalized wear on posterior and anterior.   No significant findings    Probing depth range (mm):  Upper right: 2-4  Upper left: 3-6  Lower left: 3-5  Lower right: 3-9 (9mm on distal of #30)    Radiographic exam: Radiographs revealed no periradicular or periapical radiolucent lesions  associated with teeth , coronal radiolucent lesions consistent with decay on teeth  #18-lower left 2nd molar, #20-lower left 2nd premolar, and #29-lower right 2nd premolar,  radiopacities on teeth  consistent with restorations on teeth,  moderate RBL noted   Abnormal findings include:     The post-operative diagnosis was Same as pre-operative diagnosis

## 2024-05-23 NOTE — ANESTHESIA PROCEDURE NOTES
Airway       Patient location during procedure: OR       Procedure Start/Stop Times: 5/23/2024 7:47 AM  Staff -        Anesthesiologist:  Isha Rivera MD       CRNA: Sp Snyder APRN CRNA       Other Anesthesia Staff: Caron Garrison APRN CRNA       Performed By: CRNAIndications and Patient Condition       Indications for airway management: brandon-procedural       Induction type:intravenous       Mask difficulty assessment: 1 - vent by mask    Final Airway Details       Final airway type: endotracheal airway       Successful airway: ETT - single and Nasal  Endotracheal Airway Details        ETT size (mm): 8.0       Cuffed: yes       Successful intubation technique: video laryngoscopy       VL Blade Size: MAC 4       Grade View of Cords: 1       Adjucts: magill forceps (red rubber)       Position: Right       Measured from: nares       Secured at (cm): 28       Bite block used: None    Post intubation assessment        Placement verified by: capnometry, equal breath sounds and chest rise        Number of attempts at approach: 1       Secured with: tape       Ease of procedure: easy       Dentition: Intact and Unchanged    Medication(s) Administered   Medication Administration Time: 5/23/2024 7:47 AM

## 2024-05-23 NOTE — ANESTHESIA POSTPROCEDURE EVALUATION
Patient: Ge Gregory    Procedure: Procedure(s):  Bilateral dental exam, radiographs, three dental restorations, periodontal therapy, fluoride varnish in the mouth       Anesthesia Type:  General    Note:  Disposition: Outpatient   Postop Pain Control: Uneventful            Sign Out: Well controlled pain   PONV: No   Neuro/Psych: Uneventful            Sign Out: Acceptable/Baseline neuro status   Airway/Respiratory: Uneventful            Sign Out: Acceptable/Baseline resp. status   CV/Hemodynamics: Uneventful            Sign Out: Acceptable CV status; No obvious hypovolemia; No obvious fluid overload   Other NRE:    DID A NON-ROUTINE EVENT OCCUR?            Last vitals:  Vitals Value Taken Time   /81 05/23/24 1030   Temp 37.4  C (99.3  F) 05/23/24 1015   Pulse 102 05/23/24 1030   Resp 22 05/23/24 1015   SpO2 95 % 05/23/24 1100       Electronically Signed By: Isha Parnell MD  May 23, 2024  1:47 PM

## 2024-05-23 NOTE — ANESTHESIA PREPROCEDURE EVALUATION
Anesthesia Pre-Procedure Evaluation    Patient: Ge Gregory   MRN: [id number] : 1990        Procedure : Procedure(s):  Bilateral dental exam, radiographs, dental restorations, dental extractions, pulpotomy, root canal therapy, biopsy, frenectomy, gingivectomy, alveoloplasty, periodontal therapy, fluoride varnish in the mouth          Past Medical History:   Diagnosis Date    Autism     Autism     Autistic spectrum disorder     Congenital ptosis     Deaf     left ear only    Dental caries     Development delay     Development delay     Global    Developmental delay     Developmental delay     Exotropia     left     Fetal alcohol effects     fetal alcohol exposure    Fetal alcohol syndrome     Hearing loss     Insomnia     PONV (postoperative nausea and vomiting)       Past Surgical History:   Procedure Laterality Date    dental surgeries      ear surgeries      EXAM UNDER ANESTHESIA, RESTORATIONS, EXTRACTION(S) DENTAL COMPLEX, COMBINED Bilateral 2023    Procedure: Bilateral dental exam, Dental radiographs, dental restorations X 5, dental extractions X 1, periodontal therapy, fluoride, varnish in the mouth,;  Surgeon: Wilfred Campos DDS;  [facility]    EXAM UNDER ANESTHESIA, RESTORATIONS, EXTRACTION(S) DENTAL, COMBINED  2011    Procedure:COMBINED EXAM UNDER ANESTHESIA, RESTORATIONS, EXTRACTION(S) DENTAL; Biopsy and no extractions or alveoloplasty done; Surgeon:SOFIYA GALLEGOS; [facility]    EXAM UNDER ANESTHESIA, RESTORATIONS, EXTRACTION(S) DENTAL, COMBINED  10/23/2013    Procedure: COMBINED EXAM UNDER ANESTHESIA, RESTORATIONS, EXTRACTION(S) DENTAL;  Dental Exam, Under Anesthesia, Radiographs, Dental Restorations, Periodontal Therapy, Dental ExtractionsX1, Gingivectomy, Fluoride Varnish;  Surgeon: Sofiya Gallegos DDS;  [facility]    EXAM UNDER ANESTHESIA, RESTORATIONS, EXTRACTION(S) DENTAL, COMBINED N/A 2016    Procedure: COMBINED EXAM UNDER ANESTHESIA,  RESTORATIONS, EXTRACTION(S) DENTAL;  Surgeon: Debra David DDS;  [facility]    EXAM UNDER ANESTHESIA, RESTORATIONS, EXTRACTION(S) DENTAL, COMBINED N/A 8/14/2017    Procedure: COMBINED EXAM UNDER ANESTHESIA, RESTORATIONS, EXTRACTION(S) DENTAL;  Dental Exam, Restorations x4, Radiographs, Periodontal Therapy, fluoride treatment;  Surgeon: Wilfred Campos DDS;  [facility]    EXAM UNDER ANESTHESIA, RESTORATIONS, EXTRACTION(S) DENTAL, COMBINED N/A 5/9/2019    Procedure: Right/Left Dental Exam, Radiographs, Eight Restorations, Periodontal Therapy, and Fluoride Treatment;  Surgeon: Dunia Castro DDS;  [facility]    EYE SURGERY  12/05/2001    JON frontal sling Dr. Rivera    EYE SURGERY  01/17/2001    BMR, RSR     EYE SURGERY  6/95    [facility] Dr. Ybarra    PE TUBES      STRABISMUS SURGERY        No Known Allergies   Social History     Tobacco Use    Smoking status: Passive Smoke Exposure - Never Smoker    Smokeless tobacco: Never    Tobacco comments:     occasional exposure to second hand smoke   Substance Use Topics    Alcohol use: No      Wt Readings from Last 1 Encounters:   05/23/24 126.3 kg (278 lb 7.1 oz)        Anesthesia Evaluation   Pt has had prior anesthetic. Type: General.        ROS/MED HX  ENT/Pulmonary:  - neg pulmonary ROS     Neurologic:     (+)                         Developmental delay,       Cardiovascular:  - neg cardiovascular ROS     METS/Exercise Tolerance:     Hematologic:  - neg hematologic  ROS     Musculoskeletal:  - neg musculoskeletal ROS     GI/Hepatic:  - neg GI/hepatic ROS     Renal/Genitourinary:  - neg Renal ROS     Endo:  - neg endo ROS     Psychiatric/Substance Use:       Infectious Disease:  - neg infectious disease ROS     Malignancy:  - neg malignancy ROS     Other:            Physical Exam    Airway        Mallampati: III   TM distance: > 3 FB   Neck ROM: full   Mouth opening: > 3 cm    Respiratory Devices and Support         Dental       (+)  "Multiple visibly decayed, broken teeth      Cardiovascular   cardiovascular exam normal          Pulmonary   pulmonary exam normal                OUTSIDE LABS:  CBC:   Lab Results   Component Value Date    WBC 8.5 10/23/2013    WBC 7.6 07/06/2011    HGB 13.5 10/23/2013    HGB 13.2 (L) 07/06/2011    HCT 38.3 (L) 10/23/2013    HCT 38.3 (L) 07/06/2011     10/23/2013     (L) 07/06/2011     BMP:   Lab Results   Component Value Date    POTASSIUM 3.9 10/23/2013     COAGS:   Lab Results   Component Value Date    PTT 50 (H) 10/23/2013    INR 1.25 (H) 10/23/2013     POC: No results found for: \"BGM\", \"HCG\", \"HCGS\"  HEPATIC: No results found for: \"ALBUMIN\", \"PROTTOTAL\", \"ALT\", \"AST\", \"GGT\", \"ALKPHOS\", \"BILITOTAL\", \"BILIDIRECT\", \"AGUILA\"  OTHER: No results found for: \"PH\", \"LACT\", \"A1C\", \"FERNANDO\", \"PHOS\", \"MAG\", \"LIPASE\", \"AMYLASE\", \"TSH\", \"T4\", \"T3\", \"CRP\", \"SED\"    Anesthesia Plan    ASA Status:  3    NPO Status:  NPO Appropriate    Anesthesia Type: General.     - Airway: ETT      Maintenance: Balanced.        Consents    Anesthesia Plan(s) and associated risks, benefits, and realistic alternatives discussed. Questions answered and patient/representative(s) expressed understanding.     - Discussed: Risks, Benefits and Alternatives for the PROCEDURE were discussed     - Discussed with:       - Extended Intubation/Ventilatory Support Discussed: No.      - Patient is DNR/DNI Status: No     Use of blood products discussed: No .     Postoperative Care    Pain management: Multi-modal analgesia.   PONV prophylaxis: Ondansetron (or other 5HT-3), Dexamethasone or Solumedrol     Comments:    Other Comments: DD-- will use I.m ketamine if patient does not allow IV placement           Isha Parnell MD    I have reviewed the pertinent notes and labs in the chart from the past 30 days and (re)examined the patient.  Any updates or changes from those notes are reflected in this note.              # Obesity: Estimated body mass " "index is 35.75 kg/m  as calculated from the following:    Height as of 5/8/24: 1.88 m (6' 2\").    Weight as of this encounter: 126.3 kg (278 lb 7.1 oz).      "

## 2024-06-14 ENCOUNTER — OFFICE VISIT (OUTPATIENT)
Dept: OPHTHALMOLOGY | Facility: CLINIC | Age: 34
End: 2024-06-14
Payer: MEDICAID

## 2024-06-14 DIAGNOSIS — H17.9 CORNEAL SCARS, BOTH EYES: Primary | ICD-10-CM

## 2024-06-14 DIAGNOSIS — Z98.890 HISTORY OF PTOSIS REPAIR: ICD-10-CM

## 2024-06-14 DIAGNOSIS — H50.15 ALTERNATING EXOTROPIA: ICD-10-CM

## 2024-06-14 DIAGNOSIS — H52.223 REGULAR ASTIGMATISM OF BOTH EYES: ICD-10-CM

## 2024-06-14 PROCEDURE — 92014 COMPRE OPH EXAM EST PT 1/>: CPT | Performed by: OPTOMETRIST

## 2024-06-14 ASSESSMENT — VISUAL ACUITY
OD_SC+: +3
METHOD: ALLEN PICTURES
OS_SC+: +2
OD_SC: 20/30
METHOD_MR_RETINOSCOPY: 1
OS_SC: 20/50

## 2024-06-14 ASSESSMENT — SLIT LAMP EXAM - LIDS: COMMENTS: NORMAL

## 2024-06-14 ASSESSMENT — CUP TO DISC RATIO
OD_RATIO: 0.25
OS_RATIO: 0.25

## 2024-06-14 ASSESSMENT — EXTERNAL EXAM - RIGHT EYE: OD_EXAM: NORMAL

## 2024-06-14 ASSESSMENT — REFRACTION_MANIFEST
OD_SPHERE: -0.50
OD_CYLINDER: +0.50
OS_SPHERE: -0.50
OS_AXIS: 180
OS_CYLINDER: +0.50
OD_AXIS: 180

## 2024-06-14 NOTE — NURSING NOTE
Chief Complaints and History of Present Illnesses   Patient presents with    Annual Eye Exam       Chief Complaint(s) and History of Present Illness(es)       Annual Eye Exam              Laterality: both eyes              Comments    Patient returns for his annual eye health exam. Patient feels his vision is good. He has no floaters or flashing and no ocular pain or discomfort. He does not use eye drops or medications. No other ocular concerns today.                    Juanita Freeman, COA

## 2024-06-18 NOTE — PROGRESS NOTES
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   Plan: Monitor annually.     (H50.15) Alternating exotropia  Comment: History of strabismus surgery.   Plan: Monitor.    (Z98.890) History of ptosis repair  Comment: With Dr. Rivera. Looks good today.   Plan: Monitor.     (H52.223) Regular astigmatism of both eyes  Plan: Hold off on glasses as patient would not likely tolerate any Rx. Rx amount would be unlikely to improve quality of life at this point.       Complete documentation of historical and exam elements from today's encounter can  be found in the full encounter summary report (not reduplicated in this progress  note). I personally obtained the chief complaint(s) and history of present illness. I  confirmed and edited as necessary the review of systems, past medical/surgical  history, family history, social history, and examination findings as documented by  others; and I examined the patient myself. I personally reviewed the relevant tests,  images, and reports as documented above. I formulated and edited as necessary the  assessment and plan and discussed the findings and management plan with the  patient and family.    Tab Crespo, OD

## 2025-06-15 ENCOUNTER — HEALTH MAINTENANCE LETTER (OUTPATIENT)
Age: 35
End: 2025-06-15

## (undated) DEVICE — Device

## (undated) DEVICE — LINEN GOWN X4 5410

## (undated) DEVICE — SOL WATER IRRIG 1000ML BOTTLE 2F7114

## (undated) DEVICE — POSITIONER ARMBOARD FOAM 1PAIR LF FP-ARMB1

## (undated) DEVICE — BASIN SET MAJOR

## (undated) DEVICE — SUCTION TIP YANKAUER W/O VENT K86

## (undated) DEVICE — LIGHT HANDLE X2

## (undated) DEVICE — SYR EAR BULB 3OZ 0035830

## (undated) DEVICE — LINEN ORTHO PACK 5446

## (undated) DEVICE — TOOTHBRUSH ADULT NON STERILE MDS136850

## (undated) DEVICE — GOWN IMPERVIOUS SPECIALTY XLG/XLONG 32474

## (undated) DEVICE — BRUSH SURGICAL SCRUB PLAIN STERILE 4454A

## (undated) DEVICE — STRAP KNEE/BODY 31143004

## (undated) DEVICE — PREP POVIDONE IODINE SWABSTICKS TRIPLE PACK MDS093902A

## (undated) DEVICE — LABEL MEDICATION SYSTEM 3303-P

## (undated) DEVICE — LINEN GOWN OVERSIZE 5408

## (undated) DEVICE — SOL NACL 0.9% IRRIG 1000ML BOTTLE 2F7124

## (undated) DEVICE — GLOVE BIOGEL PI MICRO SZ 8.5 48585

## (undated) DEVICE — PREP POVIDONE IODINE 10% SWABSTICKS TRIPLE PACK AS-PVPSBPT

## (undated) DEVICE — SOLIDIFIER (USE FOR UP TO 1500CC) MSOLID1500

## (undated) DEVICE — SUCTION CANISTER MEDIVAC LINER 1500ML W/LID 65651-515

## (undated) DEVICE — COVER PROBE ULTRASOUND 3D W/GEL 5X96" LF 20-P3D596

## (undated) DEVICE — TUBING SUCTION MEDI-VAC 1/4"X20' N620A

## (undated) DEVICE — PACK SET-UP STD 9102

## (undated) DEVICE — PREP POVIDONE IODINE SOLUTION 10% 120ML

## (undated) DEVICE — SPONGE RAY-TEC 4X8" 7318

## (undated) DEVICE — ANTIFOG SOLUTION W/FOAM PAD 31142527

## (undated) DEVICE — RX BACITRACIN OINTMENT 0.9G 1/32OZ 01680 11109

## (undated) DEVICE — NDL 25GA 1.5" 305127

## (undated) DEVICE — BUR STRK SIDE CUT 1.6X5.1X44.8MM CARBIDE 6FLUTE 1607-002-107

## (undated) DEVICE — SURGICEL HEMOSTAT 4X8" 1952

## (undated) DEVICE — GLOVE PROTEXIS W/NEU-THERA 8.5  2D73TE85

## (undated) DEVICE — PREP POVIDONE-IODINE 10% SOLUTION 4OZ BOTTLE MDS093944

## (undated) DEVICE — SYR 10ML FINGER CONTROL W/O NDL 309695

## (undated) DEVICE — SYR EAR 3OZ BULB IRR STRL DISP BLU PVC 4173

## (undated) DEVICE — WIPES FOLEY CARE SURESTEP PROVON DFC100

## (undated) DEVICE — MARKING PEN REG/FINE DUALT TIP WITH RULER 1437SR-100

## (undated) DEVICE — OINTMENT ANTIBIOTIC BACITRACIN ZINC .9 G 1171

## (undated) DEVICE — SOL HYDROGEN PEROXIDE 3% 4OZ BOTTLE F0010

## (undated) DEVICE — GLOVE PROTEXIS W/NEU-THERA 6.5  2D73TE65

## (undated) DEVICE — RX BACITRACIN OINTMENT 0.9G 1/32OZ CUR001109

## (undated) DEVICE — ESU GROUND PAD UNIVERSAL W/O CORD

## (undated) RX ORDER — FENTANYL CITRATE 50 UG/ML
INJECTION, SOLUTION INTRAMUSCULAR; INTRAVENOUS
Status: DISPENSED
Start: 2017-08-14

## (undated) RX ORDER — KETOROLAC TROMETHAMINE 30 MG/ML
INJECTION, SOLUTION INTRAMUSCULAR; INTRAVENOUS
Status: DISPENSED
Start: 2024-05-23

## (undated) RX ORDER — LIDOCAINE HYDROCHLORIDE 20 MG/ML
INJECTION, SOLUTION EPIDURAL; INFILTRATION; INTRACAUDAL; PERINEURAL
Status: DISPENSED
Start: 2019-05-09

## (undated) RX ORDER — OXYMETAZOLINE HYDROCHLORIDE 0.05 G/100ML
SPRAY NASAL
Status: DISPENSED
Start: 2024-05-23

## (undated) RX ORDER — PROPOFOL 10 MG/ML
INJECTION, EMULSION INTRAVENOUS
Status: DISPENSED
Start: 2024-05-23

## (undated) RX ORDER — GLYCOPYRROLATE 0.2 MG/ML
INJECTION INTRAMUSCULAR; INTRAVENOUS
Status: DISPENSED
Start: 2019-05-09

## (undated) RX ORDER — KETAMINE HYDROCHLORIDE 10 MG/ML
INJECTION, SOLUTION INTRAMUSCULAR; INTRAVENOUS
Status: DISPENSED
Start: 2017-08-14

## (undated) RX ORDER — FENTANYL CITRATE 50 UG/ML
INJECTION, SOLUTION INTRAMUSCULAR; INTRAVENOUS
Status: DISPENSED
Start: 2024-05-23

## (undated) RX ORDER — GLYCOPYRROLATE 0.2 MG/ML
INJECTION, SOLUTION INTRAMUSCULAR; INTRAVENOUS
Status: DISPENSED
Start: 2017-08-14

## (undated) RX ORDER — DEXAMETHASONE SODIUM PHOSPHATE 4 MG/ML
INJECTION, SOLUTION INTRA-ARTICULAR; INTRALESIONAL; INTRAMUSCULAR; INTRAVENOUS; SOFT TISSUE
Status: DISPENSED
Start: 2017-08-14

## (undated) RX ORDER — CHLORHEXIDINE GLUCONATE ORAL RINSE 1.2 MG/ML
SOLUTION DENTAL
Status: DISPENSED
Start: 2023-02-16

## (undated) RX ORDER — LIDOCAINE HYDROCHLORIDE 20 MG/ML
INJECTION, SOLUTION EPIDURAL; INFILTRATION; INTRACAUDAL; PERINEURAL
Status: DISPENSED
Start: 2017-08-14

## (undated) RX ORDER — ONDANSETRON 2 MG/ML
INJECTION INTRAMUSCULAR; INTRAVENOUS
Status: DISPENSED
Start: 2024-05-23

## (undated) RX ORDER — FENTANYL CITRATE 50 UG/ML
INJECTION, SOLUTION INTRAMUSCULAR; INTRAVENOUS
Status: DISPENSED
Start: 2019-05-09

## (undated) RX ORDER — OXYMETAZOLINE HYDROCHLORIDE 0.05 G/100ML
SPRAY NASAL
Status: DISPENSED
Start: 2017-08-14

## (undated) RX ORDER — CEFAZOLIN SODIUM 1 G/3ML
INJECTION, POWDER, FOR SOLUTION INTRAMUSCULAR; INTRAVENOUS
Status: DISPENSED
Start: 2019-05-09

## (undated) RX ORDER — PROPOFOL 10 MG/ML
INJECTION, EMULSION INTRAVENOUS
Status: DISPENSED
Start: 2019-05-09

## (undated) RX ORDER — ONDANSETRON 2 MG/ML
INJECTION INTRAMUSCULAR; INTRAVENOUS
Status: DISPENSED
Start: 2019-05-09

## (undated) RX ORDER — GLYCOPYRROLATE 0.2 MG/ML
INJECTION, SOLUTION INTRAMUSCULAR; INTRAVENOUS
Status: DISPENSED
Start: 2024-05-23

## (undated) RX ORDER — DEXAMETHASONE SODIUM PHOSPHATE 4 MG/ML
INJECTION, SOLUTION INTRA-ARTICULAR; INTRALESIONAL; INTRAMUSCULAR; INTRAVENOUS; SOFT TISSUE
Status: DISPENSED
Start: 2024-05-23

## (undated) RX ORDER — CHLORHEXIDINE GLUCONATE ORAL RINSE 1.2 MG/ML
SOLUTION DENTAL
Status: DISPENSED
Start: 2024-05-23

## (undated) RX ORDER — ROPIVACAINE HYDROCHLORIDE 5 MG/ML
INJECTION, SOLUTION EPIDURAL; INFILTRATION; PERINEURAL
Status: DISPENSED
Start: 2019-05-09

## (undated) RX ORDER — DEXAMETHASONE SODIUM PHOSPHATE 4 MG/ML
INJECTION, SOLUTION INTRA-ARTICULAR; INTRALESIONAL; INTRAMUSCULAR; INTRAVENOUS; SOFT TISSUE
Status: DISPENSED
Start: 2019-05-09

## (undated) RX ORDER — FENTANYL CITRATE-0.9 % NACL/PF 10 MCG/ML
PLASTIC BAG, INJECTION (ML) INTRAVENOUS
Status: DISPENSED
Start: 2024-05-23

## (undated) RX ORDER — CEFAZOLIN SODIUM IN 0.9 % NACL 3 G/100 ML
INTRAVENOUS SOLUTION, PIGGYBACK (ML) INTRAVENOUS
Status: DISPENSED
Start: 2019-05-09

## (undated) RX ORDER — FENTANYL CITRATE 50 UG/ML
INJECTION, SOLUTION INTRAMUSCULAR; INTRAVENOUS
Status: DISPENSED
Start: 2023-02-16

## (undated) RX ORDER — OXYMETAZOLINE HYDROCHLORIDE 0.05 G/100ML
SPRAY NASAL
Status: DISPENSED
Start: 2023-02-16

## (undated) RX ORDER — ROCURONIUM BROMIDE 50 MG/5 ML
SYRINGE (ML) INTRAVENOUS
Status: DISPENSED
Start: 2017-08-14

## (undated) RX ORDER — PROPOFOL 10 MG/ML
INJECTION, EMULSION INTRAVENOUS
Status: DISPENSED
Start: 2017-08-14

## (undated) RX ORDER — OXYMETAZOLINE HYDROCHLORIDE 0.05 G/100ML
SPRAY NASAL
Status: DISPENSED
Start: 2019-05-09

## (undated) RX ORDER — EPHEDRINE SULFATE 50 MG/ML
INJECTION, SOLUTION INTRAMUSCULAR; INTRAVENOUS; SUBCUTANEOUS
Status: DISPENSED
Start: 2024-05-23

## (undated) RX ORDER — ONDANSETRON 2 MG/ML
INJECTION INTRAMUSCULAR; INTRAVENOUS
Status: DISPENSED
Start: 2017-08-14

## (undated) RX ORDER — NEOSTIGMINE METHYLSULFATE 5 MG/5 ML
SYRINGE (ML) INTRAVENOUS
Status: DISPENSED
Start: 2017-08-14

## (undated) RX ORDER — KETOROLAC TROMETHAMINE 30 MG/ML
INJECTION, SOLUTION INTRAMUSCULAR; INTRAVENOUS
Status: DISPENSED
Start: 2019-05-09

## (undated) RX ORDER — CEFAZOLIN SODIUM 2 G/100ML
INJECTION, SOLUTION INTRAVENOUS
Status: DISPENSED
Start: 2017-08-14

## (undated) RX ORDER — CHLORHEXIDINE GLUCONATE ORAL RINSE 1.2 MG/ML
SOLUTION DENTAL
Status: DISPENSED
Start: 2019-05-09